# Patient Record
Sex: MALE | Race: WHITE
[De-identification: names, ages, dates, MRNs, and addresses within clinical notes are randomized per-mention and may not be internally consistent; named-entity substitution may affect disease eponyms.]

---

## 2020-09-08 ENCOUNTER — HOSPITAL ENCOUNTER (INPATIENT)
Dept: HOSPITAL 92 - ERS | Age: 55
LOS: 8 days | Discharge: HOME | DRG: 854 | End: 2020-09-16
Attending: FAMILY MEDICINE | Admitting: FAMILY MEDICINE
Payer: COMMERCIAL

## 2020-09-08 VITALS — BODY MASS INDEX: 29.4 KG/M2

## 2020-09-08 DIAGNOSIS — Z20.828: ICD-10-CM

## 2020-09-08 DIAGNOSIS — E87.1: ICD-10-CM

## 2020-09-08 DIAGNOSIS — G62.9: ICD-10-CM

## 2020-09-08 DIAGNOSIS — R73.9: ICD-10-CM

## 2020-09-08 DIAGNOSIS — E83.51: ICD-10-CM

## 2020-09-08 DIAGNOSIS — L03.116: ICD-10-CM

## 2020-09-08 DIAGNOSIS — M86.9: ICD-10-CM

## 2020-09-08 DIAGNOSIS — F10.10: ICD-10-CM

## 2020-09-08 DIAGNOSIS — D64.9: ICD-10-CM

## 2020-09-08 DIAGNOSIS — M79.601: ICD-10-CM

## 2020-09-08 DIAGNOSIS — M14.60: ICD-10-CM

## 2020-09-08 DIAGNOSIS — R65.20: ICD-10-CM

## 2020-09-08 DIAGNOSIS — Z89.422: ICD-10-CM

## 2020-09-08 DIAGNOSIS — M48.061: ICD-10-CM

## 2020-09-08 DIAGNOSIS — A41.9: Primary | ICD-10-CM

## 2020-09-08 DIAGNOSIS — I48.0: ICD-10-CM

## 2020-09-08 LAB
ALBUMIN SERPL BCG-MCNC: 3.5 G/DL (ref 3.5–5)
ALP SERPL-CCNC: 92 U/L (ref 40–110)
ALT SERPL W P-5'-P-CCNC: 16 U/L (ref 8–55)
ANION GAP SERPL CALC-SCNC: 19 MMOL/L (ref 10–20)
AST SERPL-CCNC: 22 U/L (ref 5–34)
BILIRUB SERPL-MCNC: 1.7 MG/DL (ref 0.2–1.2)
BUN SERPL-MCNC: 26 MG/DL (ref 8.4–25.7)
CALCIUM SERPL-MCNC: 8.3 MG/DL (ref 7.8–10.44)
CHLORIDE SERPL-SCNC: 99 MMOL/L (ref 98–107)
CK SERPL-CCNC: 90 U/L (ref 30–200)
CO2 SERPL-SCNC: 19 MMOL/L (ref 22–29)
CREAT CL PREDICTED SERPL C-G-VRATE: 0 ML/MIN (ref 70–130)
CRP SERPL-MCNC: 37.47 MG/DL
GLOBULIN SER CALC-MCNC: 2.8 G/DL (ref 2.4–3.5)
GLUCOSE SERPL-MCNC: 242 MG/DL (ref 70–105)
HGB BLD-MCNC: 15.1 G/DL (ref 14–18)
LIPASE SERPL-CCNC: 15 U/L (ref 8–78)
MAGNESIUM SERPL-MCNC: 2 MG/DL (ref 1.6–2.6)
MCH RBC QN AUTO: 31.6 PG (ref 27–31)
MCV RBC AUTO: 99.3 FL (ref 78–98)
MDIFF COMPLETE?: YES
PLATELET # BLD AUTO: 162 THOU/UL (ref 130–400)
POTASSIUM SERPL-SCNC: 4.2 MMOL/L (ref 3.5–5.1)
RBC # BLD AUTO: 4.77 MILL/UL (ref 4.7–6.1)
SODIUM SERPL-SCNC: 133 MMOL/L (ref 136–145)
TROPONIN I SERPL DL<=0.01 NG/ML-MCNC: 0.02 NG/ML (ref ?–0.03)
TROPONIN I SERPL DL<=0.01 NG/ML-MCNC: 0.02 NG/ML (ref ?–0.03)
URATE SERPL-MCNC: 5.3 MG/DL (ref 3.5–7.2)
WBC # BLD AUTO: 15.1 THOU/UL (ref 4.8–10.8)

## 2020-09-08 PROCEDURE — S0028 INJECTION, FAMOTIDINE, 20 MG: HCPCS

## 2020-09-08 PROCEDURE — 87070 CULTURE OTHR SPECIMN AEROBIC: CPT

## 2020-09-08 PROCEDURE — 83605 ASSAY OF LACTIC ACID: CPT

## 2020-09-08 PROCEDURE — A9579 GAD-BASE MR CONTRAST NOS,1ML: HCPCS

## 2020-09-08 PROCEDURE — 36569 INSJ PICC 5 YR+ W/O IMAGING: CPT

## 2020-09-08 PROCEDURE — 87205 SMEAR GRAM STAIN: CPT

## 2020-09-08 PROCEDURE — 83036 HEMOGLOBIN GLYCOSYLATED A1C: CPT

## 2020-09-08 PROCEDURE — 96366 THER/PROPH/DIAG IV INF ADDON: CPT

## 2020-09-08 PROCEDURE — 82607 VITAMIN B-12: CPT

## 2020-09-08 PROCEDURE — 85007 BL SMEAR W/DIFF WBC COUNT: CPT

## 2020-09-08 PROCEDURE — 96375 TX/PRO/DX INJ NEW DRUG ADDON: CPT

## 2020-09-08 PROCEDURE — 36415 COLL VENOUS BLD VENIPUNCTURE: CPT

## 2020-09-08 PROCEDURE — 83880 ASSAY OF NATRIURETIC PEPTIDE: CPT

## 2020-09-08 PROCEDURE — 83735 ASSAY OF MAGNESIUM: CPT

## 2020-09-08 PROCEDURE — 84550 ASSAY OF BLOOD/URIC ACID: CPT

## 2020-09-08 PROCEDURE — 86140 C-REACTIVE PROTEIN: CPT

## 2020-09-08 PROCEDURE — 85652 RBC SED RATE AUTOMATED: CPT

## 2020-09-08 PROCEDURE — 82746 ASSAY OF FOLIC ACID SERUM: CPT

## 2020-09-08 PROCEDURE — C1751 CATH, INF, PER/CENT/MIDLINE: HCPCS

## 2020-09-08 PROCEDURE — 93306 TTE W/DOPPLER COMPLETE: CPT

## 2020-09-08 PROCEDURE — 96376 TX/PRO/DX INJ SAME DRUG ADON: CPT

## 2020-09-08 PROCEDURE — 85027 COMPLETE CBC AUTOMATED: CPT

## 2020-09-08 PROCEDURE — 96367 TX/PROPH/DG ADDL SEQ IV INF: CPT

## 2020-09-08 PROCEDURE — 96361 HYDRATE IV INFUSION ADD-ON: CPT

## 2020-09-08 PROCEDURE — 96365 THER/PROPH/DIAG IV INF INIT: CPT

## 2020-09-08 PROCEDURE — 82550 ASSAY OF CK (CPK): CPT

## 2020-09-08 PROCEDURE — 85025 COMPLETE CBC W/AUTO DIFF WBC: CPT

## 2020-09-08 PROCEDURE — 83690 ASSAY OF LIPASE: CPT

## 2020-09-08 PROCEDURE — 80202 ASSAY OF VANCOMYCIN: CPT

## 2020-09-08 PROCEDURE — 80053 COMPREHEN METABOLIC PANEL: CPT

## 2020-09-08 PROCEDURE — 84484 ASSAY OF TROPONIN QUANT: CPT

## 2020-09-08 PROCEDURE — 87040 BLOOD CULTURE FOR BACTERIA: CPT

## 2020-09-08 PROCEDURE — U0002 COVID-19 LAB TEST NON-CDC: HCPCS

## 2020-09-08 PROCEDURE — 71045 X-RAY EXAM CHEST 1 VIEW: CPT

## 2020-09-08 PROCEDURE — 93005 ELECTROCARDIOGRAM TRACING: CPT

## 2020-09-08 NOTE — PDOC.FPRHP
- History of Present Illness


Chief Complaint: foot hurts


History of Present Illness: 





Patient is a 54 yo male with history of presenting to [facility] ED due to 

pain in his left foot. Patient states that this has happened before, but not to 

this extent. His left foot began to swell on Saturday, 3 days ago. Patient also 

noticed that his left foot became quite erythematous and painful by Saturday 

afternoon. Patient has a history of a lumbar back surgery. After his back 

surgery, he admits to decreased sensation to his feet. He also reports 

amputation of his left big toe following a bone infection. Today, patient 

states that he woke up and noticed right arm pain and left chest pain. The 

chest pain subsided once he got to the ED.





In the ED, patient was found to be in atrial fibrillation. 











- Allergies/Adverse Reactions


 Allergies











Allergy/AdvReac Type Severity Reaction Status Date / Time


 


No Known Allergies Allergy   Verified 09/09/20 00:48














- History


PMHx: denies past medical history other than surgical history 





PSHx: left first toe amputation 1 year ago, L1-5 surgery 





FHx: noncontributory





SHx: admits to daily EtoH consumption - 1 bottle of Scotch per day, dips 

tobacco daily, occasional marijuana use


 








- Review of Systems


General: reports: fever/chills


Eyes: denies: vision changes


ENT: denies: nasal congestion, rhinorrhea


Respiratory: denies: cough, congestion, shortness of breath


Cardiovascular: reports: chest pain.  denies: palpitation, edema


Gastrointestinal: denies: nausea, vomiting, diarrhea, constipation, abdominal 

pain


Genitourinary: denies: incontinence, dysuria


Skin: denies: rashes, lesions


Musculoskeletal: reports: pain, tenderness, swelling


Neurological: reports: numbness.  denies: weakness


Psychological: denies: anxiety, depression





- Vital signs


BP: 128/63  HR: 139 RR: 26 Tmax: 99.1 Pox: 95% on 2L  Wt: 100 kg   








- Physical Exam


Constitutional: NAD, awake, alert and oriented


HEENT: normocephalic and atraumatic, EOMI, conjunctiva clear


Neck: supple, FROM


Chest: no-tender to palpation


Heart: other (tachycardic, irregular rhythm)


Lungs: no respiratory distress, good air movement, no rales/rhonchi, other (

tachypnec)


Abdomen: non-tender, no masses/distention


Musculoskeletal: other (tenderness to palpation to the right forearm most 

prominently in the wrist joint; amputated right first toe with edema and 

erythema noted)


Neurological: DTRs 2+, other (decreased sensation in biltaeral lower extremities

)


Skin: no rash/lesions, no jaundice


Heme/Lymphatic: no unusual bruising or bleeding, no purpura, no petechia


Psychiatric: normal mood and affect, intact recent and remote memory





FMR H&P: Results





- Labs


Result Diagrams: 


 09/09/20 03:12





 09/09/20 03:12


Lab results: 


 











WBC  15.1 thou/uL (4.8-10.8)  H  09/08/20  13:26    


 


Hgb  15.1 g/dL (14.0-18.0)   09/08/20  13:26    


 


Hct  47.4 % (42.0-52.0)   09/08/20  13:26    


 


MCV  99.3 fL (78.0-98.0)  H  09/08/20  13:26    


 


Plt Count  162 thou/uL (130-400)   09/08/20  13:26    


 


Band Neuts % (Manual)  15 % (5-11)  H  09/08/20  13:26    


 


Sodium  133 mmol/L (136-145)  L  09/08/20  13:26    


 


Potassium  4.2 mmol/L (3.5-5.1)   09/08/20  13:26    


 


Chloride  99 mmol/L ()   09/08/20  13:26    


 


Carbon Dioxide  19 mmol/L (22-29)  L  09/08/20  13:26    


 


BUN  26 mg/dL (8.4-25.7)  H  09/08/20  13:26    


 


Creatinine  1.24 mg/dL (0.7-1.3)   09/08/20  13:26    


 


Glucose  242 mg/dL ()  H  09/08/20  13:26    


 


Lactic Acid  2.8 mmol/L (0.5-2.2)  H  09/08/20  13:26    


 


Calcium  8.3 mg/dL (7.8-10.44)   09/08/20  13:26    


 


Total Bilirubin  1.7 mg/dL (0.2-1.2)  H  09/08/20  13:26    


 


AST  22 U/L (5-34)   09/08/20  13:26    


 


ALT  16 U/L (8-55)   09/08/20  13:26    


 


Alkaline Phosphatase  92 U/L ()   09/08/20  13:26    


 


Creatine Kinase  90 U/L ()   09/08/20  13:26    


 


Serum Total Protein  6.3 g/dL (6.0-8.3)   09/08/20  13:26    


 


Albumin  3.5 g/dL (3.5-5.0)   09/08/20  13:26    


 


Lipase  15 U/L (8-78)   09/08/20  13:26    














- Radiology Interpretation


  ** Other


Additional comment: 


Chest Xray: no radiographic evidence of acute cardiopulmnary process


Foot Xray: ray amputation of great toe, neuropathic osteoarthropathy of midfoot

, no definite acute osseous abnormality, prominent soft tissue swelling








FMR H&P: A/P





- Plan


Afib with RVR


- likely 2/2 to sepsis


- started on dilt drip


- TTE ordered


- admitting to IMCU


- BNP ordered





Sepsis 


- source: likely cellulitis vs. septic joint vs. gout


- SIRS criteria: tachycardic, tachypneic, WBC of 15.1


- lactic acid: 2.8 > 1.4


- started on vanc and cefepime


- s/p fluids in ED


- ESR, CRP, Uric acid pending





Hyperglycemia


- glucose of 242, denies history of DM


- will order A1C





Alcohol Abuse


- reports drinking 1+ pint of scotch/day


- denies any history of withdrawal signs/symptoms


- ASE protocol in place





PCP: [facility]


PPx: Lovenox


Diet: NPO


Code: Full 





Dispo: will admit to IMCU; LOS > 48hrs











FMR H&P: Upper Level





- Pertinent history


55-year-old male presents to the emergency department complaining of right 

wrist left foot and left-sided chest pain. Patient stated that his left foot 

started hurting on Saturday. Right wrist started hurting today. Left-sided 

chest pain started later this morning. Patient does not recall if he had any 

specific injuries. Patient states that she chronically drinks 1 pint or more of 

scotch per day. Denies any history of withdrawal. patient states that he has 

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 

act surgery. Due to this he has had chronic problems with his feet and 

subsequently had a amputation of his left great metatarsal [place] approximately 

one year ago. He noticed that his left foot started to become painful and 

swollen and red a few days ago. He states that he has poor feeling in the foot 

but does experience pain currently.





In the emergency department patient was found to be in a fib with RVR which the 

patient states is a new findings to him. He was also found to have a elevated 

glucose but denies any history of diabetes. Patient admitted that he has not 

seen a doctor in to or greater years. He states that he normally receives most 

of his care at an outside facility but came here today because his Daughter 

works at this hospital and he suspects that he has multiple problems going on 

which concerns him.





ROS:


General: denies fevers, chills, fatigue


pulm: Denies shortness of breath, cough, rhinorrhea


cardiac: denies palpitations, lightheadedness, dizziness. confirms left cgeat 

wall pain


GI: denies nausea, vomiting, diarrhea


Extremities: confirms left fore foot, right wrist pain


Skin: denies any lesions, infections. Confirms left foot swelling and erythema


Neuro: Confirms Bilateral foot numbness. Denies headache, weakness, dizziness





PE:


Gen: Appears sleepy, likely 2/2 to benzos in ED, well nourished, NAD


HEENT: EMOI, no scleral icterus


Pulm: CTAB


Cardiac: irregularly irregular with rapid rate, no audible murmur


Abd: Soft, non tender, non distended


Ext: Left foot with mid 1 metatarsal amputation, swollen, minimally tender and 

erythematous. chronic charcot changes to bilateral feet. right wrist is very 

tender with in ROM or palpation. pulse and sensation intact.


Neuro: A&Ox3


Skin: No rash or jaundice





- Pertinent findings


New onset afib with RVR


- Received 2x 5mg IV push of dilt in ED followed by dilt ggt


   - Initially had low BP response that improved after 2L NS


- Will admit to IMCU and continue dilt drip with goal of rate control


   - If BP continues to be a problem will need to consider amio and cards 

consult


   - If hemodynamically unstable consider defib


- Afib likely 2/2 to new sepsis, if does not resolve will need to consider 

therapeutic anticoagulation


- Trops negative, trend x3 





Sepsis


- Cellulitis vs septic joint vs osteomyelitis vs gout


- ESR, CRP, uric acid, procal ordered


- Likely will need left foot MRI to r/o osteo vs other soft tissue infection - 

xray was not definitive and showed severe soft tissue swelling


- Continue fluid resuscitation


- IV vanc and cefepime


- Continue to trend CBC and monitor left foot





Alcohol Abuse


- 1 pint or more of scotch per day, no hx of withdrawal


- ASE protocol


- Will discuss and encourage cessation





Hyperglycemia


- Likely 2/2 to DM, denies hx


- A1c ordered





Plan: Admit to IMCU for dilt drip management. Goal of rate control. Will 

continue to work up source of sepsis, most likely due to left foot but unsure 

exact cause. Will continue to medically optimize other underlying medical 

issues.





For more detail see PGY1 note above





Hilario Hampton PGY2





- Plan


Date/Time: 09/08/20 9931








I, [], have evaluated this patient and agree with findings/plan as outlined by 

intern resident. Pertinent changes/additions are listed here.








Addendum - Attending





- Attending Attestation


Date/Time: 09/09/20 3557





I personally evaluated the patient and discussed the management with the team.


I agree with the History, Examination, Assessment and Plan documented above 

with any addition or exceptions noted below.





Antibiotics and consider additional imaging. Blood cultures.


Begin low dose cardizem.


O2 PRN.


Monitor closely in IMCU.

## 2020-09-08 NOTE — RAD
XR Foot Lt 3 View STANDARD



INDICATION: Emergency examination for left foot pain



COMPARISON: None.



FINDINGS:



Bones: There is hypertrophic destructive and disorganized changes involving the midfoot likely relate
d to a neuropathic osteoarthropathy. There is postsurgical change of a ray resection involving the

great toe. There is exuberant healed fracture deformity involving the second digit metatarsal shaft. 
No acute fractures evident. There is ankylosis of the PIP joint of the third digit. No radiopaque

foreign body is evident.



Joints: Neuropathic osteoarthropathy changes of the midfoot



Lisfranc alignment: Residual Lisfranc joint alignment appears within normal limits. There is some sag
ging of the Lisfranc joint on the lateral projection suspicious for instability related to

neuropathic osteoarthropathy.



Soft tissues: Severe soft tissue swelling of the midfoot, forefoot and distal foreleg.



IMPRESSION: 

1. Ray amputation of the great toe.

2. Neuropathic osteoarthropathy of the midfoot

3. No definite acute osseous abnormality.

4. Prominent soft tissue swelling left foot and ankle. 



Reported By: Latrell Singh 

Electronically Signed:  9/8/2020 3:01 PM

## 2020-09-08 NOTE — RAD
XR Wrist 3 Rt View STANDARD: 



9/8/2020 4:32 PM



CLINICAL INDICATION: Right wrist pain



COMPARISON: None. 



FINDINGS:



Bones:  No acute osseous abnormality. 



Joints: Joints space is preserved.. 



Soft Tissue: Normal..

  

IMPRESSION: 



No acute osseous abnormality.. 



Reported By: Latrell Snigh 

Electronically Signed:  9/8/2020 5:15 PM

## 2020-09-08 NOTE — RAD
XR Chest 1 View Portable



HISTORY: Chest pain



COMPARISON: None



FINDINGS: The heart size is at upper pole limits of normal. The lungs are well expanded without focal
 areas of consolidation, pneumothorax or pleural effusions.



IMPRESSION: No radiographic evidence of acute cardiopulmonary process.



Reported By: Demian Shelton 

Electronically Signed:  9/8/2020 2:43 PM

## 2020-09-09 LAB
ALBUMIN SERPL BCG-MCNC: 2.9 G/DL (ref 3.5–5)
ALP SERPL-CCNC: 67 U/L (ref 40–110)
ALT SERPL W P-5'-P-CCNC: 13 U/L (ref 8–55)
ANION GAP SERPL CALC-SCNC: 13 MMOL/L (ref 10–20)
AST SERPL-CCNC: 20 U/L (ref 5–34)
BILIRUB SERPL-MCNC: 1.4 MG/DL (ref 0.2–1.2)
BUN SERPL-MCNC: 19 MG/DL (ref 8.4–25.7)
CALCIUM SERPL-MCNC: 8.4 MG/DL (ref 7.8–10.44)
CHLORIDE SERPL-SCNC: 106 MMOL/L (ref 98–107)
CO2 SERPL-SCNC: 20 MMOL/L (ref 22–29)
CREAT CL PREDICTED SERPL C-G-VRATE: 169 ML/MIN (ref 70–130)
GLOBULIN SER CALC-MCNC: 3 G/DL (ref 2.4–3.5)
GLUCOSE SERPL-MCNC: 99 MG/DL (ref 70–105)
HGB BLD-MCNC: 13.7 G/DL (ref 14–18)
MCH RBC QN AUTO: 34.2 PG (ref 27–31)
MCV RBC AUTO: 100 FL (ref 78–98)
MDIFF COMPLETE?: YES
PLATELET # BLD AUTO: 164 THOU/UL (ref 130–400)
POTASSIUM SERPL-SCNC: 3.8 MMOL/L (ref 3.5–5.1)
RBC # BLD AUTO: 4.01 MILL/UL (ref 4.7–6.1)
SODIUM SERPL-SCNC: 135 MMOL/L (ref 136–145)
WBC # BLD AUTO: 15.1 THOU/UL (ref 4.8–10.8)

## 2020-09-09 RX ADMIN — VANCOMYCIN SCH MLS: 1.5 INJECTION, SOLUTION INTRAVENOUS at 03:00

## 2020-09-09 RX ADMIN — DILTIAZEM HYDROCHLORIDE SCH MG: 60 CAPSULE, EXTENDED RELEASE ORAL at 20:51

## 2020-09-09 RX ADMIN — VANCOMYCIN SCH MLS: 1.5 INJECTION, SOLUTION INTRAVENOUS at 14:08

## 2020-09-09 NOTE — MRI
EXAM:  MRI Lower Ext Jt Lt W WO Con



DATE:  9/9/2020 11:41 AM



INDICATION:  Left foot infection



COMPARISON:  Left foot radiograph dated September 8, 2020



FINDING:  Contrast: 20 cc of MultiHance.



There is postprocedural change of a great toe ray amputation. There is extensive soft tissue swelling
 and cellulitis involving the dorsal aspect of the foot and ankle. There is multiloculated fluid

collections surrounding the midfoot and forefoot suspicious for abscesses. One of the largest measure
s 4.3 x 8.2 cm on image 15 of series 9 overlying the dorsal aspect of the midfoot. There is an

additional fluid collection seen along the medial soft tissues of the midfoot, at the level of the ta
lar head measuring 3.3 cm. An additional fluid collection is seen subjacent to the great toe

metatarsal base measuring 2.9 cm on image 27 of series 11. There is some edematous change and mild en
hancement involving the mid foot which has radiographic changes of a neuropathic osteoarthropathy.

Component of an osteomyelitis particularly involving the cuneiforms, navicular and cuboid are of conc
syed. There is prominent healed deformity involving the second digit metatarsal shaft.



IMPRESSION:

1. Abnormal T2 hyperintensity with enhancement involving the mid foot tarsal bones is likely largely 
related to a neuropathic osteoarthropathy; however, superimposed osteomyelitis cannot be entirely

excluded.

2. Prominent loculated peripherally enhancing fluid collections surrounding the midfoot are suspiciou
s for periarticular abscesses.

3. Prominent left foot cellulitis and edema.



Reported By: Latrlel Singh 

Electronically Signed:  9/9/2020 4:10 PM

## 2020-09-09 NOTE — PDOC.FM
- Subjective


Subjective: 


Mr. Perez continues to complain of severe pain in his R hand. He describes the 

pain as a 10/10 burning most intense in his wrist, but extending to the tips of 

his fingers and proximal to the elbow. It is painful constantly and exacerbated 

by movement and palpation. It does not appear swollen and he denies feeling 

tight. Endorses some L foot pain but mostly at his heel which he suspects is d/

t walking a long distance to the ED yesterday. Endorses some L sided heart 

pressure that he states he mostly feels if he has slept wrong on his L 

shoulder. He denies any association with exertion. Denies dyspnea/SOB/trembling.








- Objective


Vital Signs & Weight: 


 Vital Signs (12 hours)











  Temp Pulse Ox


 


 09/09/20 04:00  99.8 F H 


 


 09/08/20 23:44  97.8 F 


 


 09/08/20 20:00  99.2 F  96








 Weight











Weight                         102.149 kg











 Most Recent Monitor Data











Heart Rate from ECG            76


 


NIBP                           135/77


 


NIBP BP-Mean                   96


 


Respiration from ECG           25


 


SpO2                           97














Result Diagrams: 


 09/09/20 03:12





 09/09/20 03:12





Phys Exam





- Physical Examination


Constitutional: NAD


Neck: supple


Respiratory: clear to auscultation bilateral


Cardiovascular: no significant murmur


HR irregular


L midfoot 1+ pitting edema. No edema of R hand/forearm


Dorsalis pedis pulses not palpable b/l. Radial pulses 1+


Psychiatric: normal affect, A&O x 3


Skin: no rash


Deviation from normal: Erythema of L midfoot swelling


-: No erythema/discoloration of R hand/forearm





Dx/Plan





- Plan


Plan: 


This is a 55M who presented to the ED with R arm pain and L sided CP





Afib with RVR


- Likely 2/2 to sepsis therefore not anticoagulating currently


- Received 2x 5mg IV push of dilt in ED followed by dilt gtt


   Initially had low BP response that improved after 2L NS


- Continue dilt gtt


   Admitting to IMCU


   If BP continues to be a problem, will need to consider amio


   If hemodynamically unstable, consider defib


- Fluctuating in and out of NSR


- Will consult cards


- TTE ordered, results pending


- 








Sepsis 


- Source: likely cellulitis vs. septic joint vs. gout


- SIRS criteria: tachycardic, tachypneic, WBC of 15.1


- Lactic acid: 2.8 > 1.4


- Vanc and Cefepime started 9/8


- ESR 68


- CRP37.5


- Uric acid nml


- BCx pending


- Foot xray not definitive. Showed severe soft tissue swelling of midfoot and 

neuropathic osteoarthropathy


- Dr. Chapin saw him and agreed with need for MRI, continued abx


- Will obtain L foot MRI


- Will consult ortho








R arm pain


- Unclear etiology


- Burning 10/10 pain worst at wrist but extending to tips of fingers and 

proximal to elbow


- No erythema/edema/deformity


- Will consult ortho


- Consider MRI








Hyperglycemia


- Glucose of 242 on arrival


- Denies history of DM


- A1C 5.2


- Monitor BGs








Alcohol Use Disorder


- Reports drinking 1+ pint of scotch/day


- Denies any history of withdrawal signs/symptoms


- ASE protocol in place








PCP: [facility]


PPx: Lovenox


Diet: NPO


Code: Full 





Dispo: will admit to IMCU; LOS > 48hrs





Addendum - Attending





- Attending Attestation


Date/Time: 09/09/20 1411





I personally evaluated the patient and discussed the management with the team.


I agree with the History, Examination, Assessment and Plan documented above 

with any addition or exceptions noted below.





Discuss foot and arm with ortho. Continue antibiotics. MRI foot.


D/c gtt after overlapping with PO.


Cards consult for afib

## 2020-09-09 NOTE — CON
DATE OF CONSULTATION:  



CHIEF COMPLAINT:  Left foot infection.



HISTORY OF PRESENT ILLNESS:  Mr. Perez is a 55-year-old male, who presented to the

hospital yesterday.  He reports that over the weekend, he began having malaise and

feeling sickly.  He began having fevers and chills.  He also began having left foot

swelling and redness as well as warmth.  He began having right arm numbness and pain

as well.  The patient has been admitted to the hospital.  He has been started on

intravenous antibiotics for possible sepsis.  MRI has been obtained as well as foot

x-rays.  The patient has a known history of Charcot arthropathy of the feet, left

more severe than the right.  He also has a history of left great toe amputation 1

year ago.  He reports that his foot is always enlarged and malaligned, however, it

is worse now since the weekend.  He has increased swelling, much more so than

normal. 



PAST MEDICAL HISTORY:  Previous back injury and subsequent surgery leading to dense

neuropathy of the bilateral lower extremities. 



PAST SURGICAL HISTORY:  Previous back surgery as well as left great toe amputation.



FAMILY MEDICAL HISTORY:  Noncontributory.



SOCIAL HISTORY:  The patient drinks alcohol daily.  He uses tobacco, uses marijuana.

 He denies other drug use. 



REVIEW OF SYSTEMS:  Positive as per HPI.  Otherwise, negative 10-point review of

systems. 



IMAGING DATA:  X-rays of the left foot as well as MRI of the left foot demonstrate

advanced Charcot arthropathy of the midfoot.  There is previous great toe

amputation.  There are destructive changes and extensive arthropathy throughout the

midfoot.  He does have a large fluid collection dorsally as well as medially at the

foot consistent with possible abscess.  There is some edema and fluid of the

midfoot, which is more consistent with Charcot changes rather than osteomyelitis.

Right wrist x-rays are negative. 



PHYSICAL EXAMINATION:

VITAL SIGNS:  Current temperature is 97.8, heart rate is 89, respiratory rate is 19,

oxygen saturation 99%. 

GENERAL:  He is alert, talkative, no apparent distress. 

HEENT:  Normocephalic, atraumatic. 

RESPIRATORY:  Breathing comfortably. 

ABDOMEN:  Soft, nontender, nondistended. 

MUSCULOSKELETAL:  The patient's left lower extremity has obvious Charcot changes.

There is deformity of the foot with prominent talar head medially.  There is pes

planus.  He has had amputation of the great toe with healed wounds.  There is a

superficial ulceration of the plantar foot.  He has a large dorsal swelling of the

foot with fluctuance to palpation dorsally.  The foot is warm and erythematous.  He

has cellulitic changes as well.  The patient's right upper extremity has difficulty

with motion at the hand.  He has neuropathy to light touch.  He is able to just very

minimally move the fingers.  He has a strongly palpable pulse.  There is no

significant ecchymosis.  No evidence of compartment syndrome. 



IMPRESSION:  A 55-year-old male with peripheral neuropathy, Charcot arthropathy, and

now likely an infection of the midfoot with abscess formation.  The patient also has

neuropathy of the right arm consistent with compression.  I have seen very similar

findings in someone who has passed out from alcohol intoxication and laid on their

arm.  I think this is the most likely scenario explaining his arm findings. 



PLAN:  Regarding the foot, we will plan for operative intervention tomorrow if he is

not significantly improved.  He will likely need a dorsal incision for drainage of

his abscess.  His foot is very severely damaged from his neuropathic changes;

however, I do not think he would want to proceed with amputation at this point.  We

will try to salvage the foot and clear the infection.  He will need to continue

intravenous antibiotics.  Observation for his right arm with elevation and continued

attempts at movement of the fingers.  N.p.o. midnight with preparation for surgery

tomorrow. 







Job ID:  [id number]

## 2020-09-09 NOTE — CON
DATE OF CONSULTATION:  09/09/2020



REASON FOR CONSULTATION:  The patient is in [facility].



HISTORY OF PRESENT ILLNESS:  The patient is pleasant, 55 years old, who presented to

the ER yesterday with left foot pain and right arm pain.  Apparently, swelling in

left foot began 3 days prior to admission.  It has become erythematous and quite

painful.  He does have a history of left great toe amputation in the past.  He also

has a history of Charcot joint in that area.  The right arm pain is somewhat

mysterious.  He does not know how that happened, but he says it is very difficult to

move the right arm and very tender to the touch. 



PAST MEDICAL HISTORY:  Essentially unremarkable.



PAST SURGICAL HISTORY:  Left first toe amputation a year ago.



FAMILY MEDICAL HISTORY:  Unremarkable.



SOCIAL HISTORY:  Drinks up to a pint of scotch per day, but usually not that much.

Dips tobacco daily.  Occasional marijuana use. 



REVIEW OF SYSTEMS:  Otherwise negative.



PHYSICAL EXAMINATION:

VITAL SIGNS:  Temperature 99.4, pulse 76, blood pressure 126/86, O2 saturation 95%. 

GENERAL:  He is awake and alert, in no distress. 

HEENT:  Remarkable for class 4 Mallampati airway with tongue hypertrophy. 

NECK:  No adenopathy or JVD. 

LUNGS:  Clear. 

CARDIAC:  S1 and S2, now regular, currently on Cardizem drip at 5 mg/hour. 

ABDOMEN:  Soft and nontender to palpation. 

EXTREMITIES:  No clubbing.  He has extensive swelling in his left foot with 4+

pitting edema.  He has tenderness over right dorsal aspect of his arm. 



LABORATORY DATA:  Sodium 135, potassium 3.8, chloride 106, CO2 of 20, BUN 19,

creatinine 0.7, and glucose 99.  Hemoglobin A1c is 5.2.  Total bilirubin 1.4.

C-reactive protein 37.  COVID test negative.  White blood cell count 15, hematocrit

40, and platelet count 164 with 67% neutrophils, 22% bands, sedimentation rate is

68. 



IMAGING DATA:  Radiographs of the foot, arm, chest are negative.



ASSESSMENT:  The patient appears to have either some type of cellulitis or

rheumatologic phenomenon going on.  Presentation does seem consistent with sepsis

and bacteremia. 



PLAN:  

1. Agree with antibiotics.

2. Would do further imaging of arm and foot.

3. Consider orthopedic consultation for foot.

4. Monitor for alcohol withdrawal symptoms.

5. Given his alcohol consumption, I would put him on thiamine.







Job ID:  [id number]

## 2020-09-10 LAB
ALBUMIN SERPL BCG-MCNC: 2.8 G/DL (ref 3.5–5)
ALP SERPL-CCNC: 83 U/L (ref 40–110)
ALT SERPL W P-5'-P-CCNC: 26 U/L (ref 8–55)
ANION GAP SERPL CALC-SCNC: 13 MMOL/L (ref 10–20)
AST SERPL-CCNC: 32 U/L (ref 5–34)
BILIRUB SERPL-MCNC: 0.6 MG/DL (ref 0.2–1.2)
BUN SERPL-MCNC: 16 MG/DL (ref 8.4–25.7)
CALCIUM SERPL-MCNC: 8.2 MG/DL (ref 7.8–10.44)
CHLORIDE SERPL-SCNC: 105 MMOL/L (ref 98–107)
CO2 SERPL-SCNC: 22 MMOL/L (ref 22–29)
CREAT CL PREDICTED SERPL C-G-VRATE: 169 ML/MIN (ref 70–130)
GLOBULIN SER CALC-MCNC: 2.8 G/DL (ref 2.4–3.5)
GLUCOSE SERPL-MCNC: 97 MG/DL (ref 70–105)
HGB BLD-MCNC: 12.9 G/DL (ref 14–18)
MCH RBC QN AUTO: 34.2 PG (ref 27–31)
MCV RBC AUTO: 101 FL (ref 78–98)
MDIFF COMPLETE?: YES
PLATELET # BLD AUTO: 180 THOU/UL (ref 130–400)
POTASSIUM SERPL-SCNC: 3.5 MMOL/L (ref 3.5–5.1)
RBC # BLD AUTO: 3.76 MILL/UL (ref 4.7–6.1)
SODIUM SERPL-SCNC: 136 MMOL/L (ref 136–145)
VANCOMYCIN TROUGH SERPL-MCNC: 12.8 UG/ML
WBC # BLD AUTO: 13.8 THOU/UL (ref 4.8–10.8)

## 2020-09-10 PROCEDURE — 0YBN0ZZ EXCISION OF LEFT FOOT, OPEN APPROACH: ICD-10-PCS | Performed by: ORTHOPAEDIC SURGERY

## 2020-09-10 RX ADMIN — VANCOMYCIN SCH MLS: 1.5 INJECTION, SOLUTION INTRAVENOUS at 18:21

## 2020-09-10 RX ADMIN — VANCOMYCIN SCH MLS: 1.5 INJECTION, SOLUTION INTRAVENOUS at 02:09

## 2020-09-10 RX ADMIN — DILTIAZEM HYDROCHLORIDE SCH MG: 60 CAPSULE, EXTENDED RELEASE ORAL at 21:51

## 2020-09-10 RX ADMIN — VANCOMYCIN SCH MLS: 1.5 INJECTION, SOLUTION INTRAVENOUS at 08:45

## 2020-09-10 RX ADMIN — DILTIAZEM HYDROCHLORIDE SCH MG: 60 CAPSULE, EXTENDED RELEASE ORAL at 08:15

## 2020-09-10 NOTE — PRG
DATE OF SERVICE:  09/10/2020



SUBJECTIVE:  Mr. Perez is doing better.  His rate appears controlled.  He has sinus

rhythm with PACs.  He is currently on diltiazem only.  Arm appears to be improved.

He has less pain today versus yesterday. 



OBJECTIVE:  VITAL SIGNS:  Blood pressure 120/80, pulse 72, respirations 20. 

LUNGS:  Clear to auscultation. 

HEART:  Regular rate and rhythm with extrasystolic beat. 

ABDOMEN:  Soft, nontender, nondistended. 

EXTREMITIES:  No edema.



IMPRESSION:  

1. Atrial fibrillation.

2. Cellulitis.

3. Alcohol abuse.

4. Right arm pain.



RECOMMENDATIONS:  

1. Recommend venous duplex of the right upper extremity.

2. Continue Cardizem.

3. The patient is scheduled for I and D today.

4. Given the patient's low CHADS2-VASc score, I would recommend full-dose aspirin.







Job ID:  [id number]

## 2020-09-10 NOTE — CON
DATE OF CONSULTATION:  09/09/2020



REASON FOR CONSULTATION:  Paroxysmal atrial fibrillation.



PRIMARY CARDIOLOGIST:  None.



HISTORY OF PRESENT ILLNESS:  Mr. Perez is a pleasant 55-year-old gentleman who

recently presented with cellulitis.  He was found to be in atrial fibrillation with

rapid ventricular response.  He was placed on IV Cardizem.  He has fluctuated

between atrial fibrillation and sinus rhythm.  He is currently asymptomatic.  His

main complaint today is left arm pain.  He does have a strong alcohol history,

drinking a quart of scotch likely every night.  He continues to work.  He states he

has been told he has had atrial fibrillation in the past, but states was unsure.  He

also was told he had diabetes in the past, but does not feel like he does.  His Hb

A1c was less than 6. 



PAST MEDICAL HISTORY:  Charcot foot.



SURGICAL HISTORY:  Left first toe amputation.



FAMILY HISTORY:  Negative.



SOCIAL HISTORY:  Positive alcohol use.  Positive tobacco use.  Occasional marijuana

use. 



REVIEW OF SYSTEMS:  Ten-point review of systems is reviewed and as above, otherwise

negative. 



PHYSICAL EXAMINATION:

VITAL SIGNS:  Blood pressure 131/81, pulse 72, respirations 20. 

GENERAL:  Patient is a pleasant male who is in no acute distress.  The patient

appears their stated age. 

NEUROLOGIC:  The patient is alert and oriented x3 with no focal neurologic deficits. 

HEENT:  Sclerae without icterus.  Mouth has moist mucous membranes with normal

pallor. 

NECK:  No JVD.  Carotid upstroke brisk.  No bruits bilaterally. 

LUNGS:  Clear to auscultation with unlabored respirations. 

BACK:  No scoliosis or kyphosis. 

CARDIAC:  Regular rate and rhythm with normal S1 and S2.  No S3 or S4 noted.  No

significant rubs, murmurs, thrills, or gallops noted throughout the precordium.  PMI

is not displaced.  There is no parasternal heave. 

ABDOMEN:  Soft, nontender, nondistended.  No peritoneal signs present.  No

hepatosplenomegaly.  No abnormal striae. 

EXTREMITIES:  2+ femoral and 2+ dorsalis pedis pulses.  No cyanosis, clubbing, or

edema. 

SKIN:  No gross abnormalities.



PERTINENT LABORATORY DATA:  Hemoglobin 12.9, hematocrit 38, white blood cell count

13.8.  Creatinine 0.72.  C-reactive protein 37.  BNP of 168. 



IMPRESSION:  

1. Paroxysmal atrial fibrillation.

2. Alcohol abuse.

3. Cellulitis.

4. Right arm pain.



RECOMMENDATIONS:  Patient's CHADS Vasc score is 1.  He does not appear to have

diabetes based on his most recent HbA1c.  He does have hypertension, although states

his blood pressure per his account has been stable.  He is on no medications at

home.  His CHADS score in my opinion is between 0 and 1.  I discussed

anticoagulation therapy versus aspirin.  He would like to proceed with aspirin

therapy only.  Would certainly seem reasonable.  Given that his heart rate is

currently stable, would recommend continuing p.o. Cardizem in addition to antibiotic

therapy.  For his left arm pain, would recommend a venous duplex of the upper

extremity, although no swelling present.  He has an excellent pulse noted to the

radial artery, so unlikely to be an obstruction from a vascular standpoint.  I did

 him on alcohol abuse.  He is on DT protocol. 







Job ID:  [id number]

## 2020-09-10 NOTE — ULT
Exam: Right upper extremity venous ultrasound with Doppler



HISTORY: Right arm pain



COMPARISON: None



TECHNIQUE: Grayscale, color flow, Doppler imaging and spectral wave form analysis of the right approx
imately venous system



FINDINGS:

There is compressibility and flow of the internal jugular vein. The flow in the subclavian vein. Ther
e is compressibility and flow in the axillary vein and brachial vein, cephalic vein, basilic vein,

radial vein and ulnar vein.



IMPRESSION: No evidence of thrombus in the right upper extremity venous system.



Reported By: Rylee Zelaya 

Electronically Signed:  9/10/2020 9:39 AM

## 2020-09-10 NOTE — OP
DATE OF PROCEDURE:  09/10/2020



OPERATION PERFORMED:  Irrigation and debridement of left foot abscess.



PREOPERATIVE DIAGNOSIS:  Left foot infection with abscess over the dorsal foot.



POSTOPERATIVE DIAGNOSIS:  Left foot infection with abscess over the dorsal foot.



COMPLICATIONS:  None.



ESTIMATED BLOOD LOSS:  Minimal.



FIRST ASSISTANT:  Rome Sage PA-C.



IMPLANTS:  None.



INDICATIONS:  Mr. Perez is a 55-year-old male who has Charcot arthropathy of the

foot.  He has developed sepsis.  He has been found to have a large abscess and

infection of the left midfoot.  He has been indicated for irrigation and debridement

of the foot to hopefully eradicate infection.  He is at high risk of further

infection and other complications including the possible need for amputation in the

future. 



DESCRIPTION OF PROCEDURE:  Mr. Perez was identified in the preoperative holding

area.  His correct extremity was marked.  He was carried to the operating room.  He

was positioned supine.  General anesthesia was induced.  A multidisciplinary

time-out was performed.  The left lower extremity was prepped and draped in sterile

fashion. 



We began the procedure with a dorsal incision over the foot.  We dissected down

through the subcutaneous tissues to the fascia, which was opened.  We encountered a

large purulent abscess.  This was cultured.  There was quite a bit of fluid

evacuated.  We at this point thoroughly irrigated with copious lavage.  We performed

an excisional debridement.  We worked down to the bony level.  We opened the midfoot

joints and irrigated into the joints themselves as well.  There was purulence of the

deep joints.  We 

performed an excisional debridement with a knife as well as a curette.  We then

loosely closed the skin edges.  At this point, we packed the wound with a Kerlix

gauze soaked in Betadine.  A sterile dressing was applied.  The patient was taken to

the recovery room in good condition without complication. 







Job ID:  [id number]

## 2020-09-10 NOTE — PDOC.FM
- Subjective


Subjective: 


Mr. Perez is doing well this morning with improved R arm pain. His R arm is 

elevated above his head as recommended by Dr. Ferrara. Pt is aware plan is to 

take him to OR for I&D today. He denies SOB/dyspnea/CP/V. Endorses some D which 

he attributes to lack of diet and several medications. 








- Objective


Vital Signs & Weight: 


 Vital Signs (12 hours)











  Temp


 


 09/10/20 04:00  97.0 F L


 


 09/10/20 00:00  97.4 F L


 


 09/09/20 19:35  97.8 F








 Weight











Admit Weight                   102.149 kg


 


Weight                         102.149 kg











 Most Recent Monitor Data











Heart Rate from ECG            63


 


NIBP                           125/78


 


NIBP BP-Mean                   93


 


Respiration from ECG           14


 


SpO2                           100














I&O: 


 











 09/08/20 09/09/20 09/10/20





 06:59 06:59 06:59


 


Intake Total   2260


 


Output Total   625


 


Balance   1635











Result Diagrams: 


 09/10/20 03:19





 09/10/20 03:19





Phys Exam





- Physical Examination


Constitutional: NAD


Neck: supple


Respiratory: no wheezing, no rales, clear to auscultation bilateral


Cardiovascular: RRR, no significant murmur


Musculoskeletal: pulses present (Strong radial and dp pulses b/l.)


Edema/Erythema of L midfoot increased sicne yesterday.


Neurological: non-focal, moves all 4 limbs


Psychiatric: normal affect, A&O x 3


Skin: no rash





Dx/Plan





- Plan


Plan: 


This is a 55M who presented to the ED with R arm pain and L sided CP





Afib with RVR


- Likely 2/2 to sepsis therefore not anticoagulating currently


- Received 2x 5mg IV push of dilt in ED followed by dilt gtt


   Initially had low BP response that improved after 2L NS


- Dilt gtt discontinued 9/9. Now PO.


   Admitting to IMCU.


   If BP continues to be a problem, will need to consider amio


   If hemodynamically unstable, consider defib


- Fluctuating in and out of NSR therefore keeping him in IMCU, per Christophe


- Consulted cards (Dr. Saeed), appreciate recs


   Recommended continued PO Diltiazem


   Is considering long-term anticoagulation depending on if AFib resolves


- Echo showed EF 50-55% with moderate tricuspid/mitral regurg and mildly 

dilated LA


- 








Sepsis 


- Source: likely cellulitis vs. septic joint vs. gout


- SIRS criteria: tachycardic, tachypneic, WBC of 15.1


- Lactic acid: 2.8 > 1.4


- Vanc and Cefepime started 9/8


- ESR 68


- CRP37.5


- Uric acid nml


- BCx pending


   Two samples NGTD x1 day


- Foot xray not definitive. Showed severe soft tissue swelling of midfoot and 

neuropathic osteoarthropathy


- L foot MRI showed suspicion for periarticular abscess. Could not r/o osteo


- Consulted ortho. Dr. Chapin recommended continuation of abx and I&D of L 

foot on 9/10


- Will consult PT/OT/post-acute care/CM d/t to severity of Charcot foot








R arm pain


- Unclear etiology


- Burning, sharp, deep and superficial pain worst at wrist but extending to 

tips of fingers and proximal to elbow


- No erythema/edema/deformity


- R wrist xray normal


- Ortho consulted, appreciate recs


- Consider MRI


- Dr. Saeed recommended R UE U/S to r/o venous causes despite what would be 

an atypical presentation for it








Hyperglycemia


- Glucose of 242 on arrival


- Denies history of DM


- A1C 5.2


- Monitor BGs








Alcohol Use Disorder


- Reports drinking scotch daily


- Denies any history of withdrawal signs/symptoms


- ASE protocol in place


- No withdrawal sxs








PCP: [facility]


PPx: Lovenox


Diet: NPO


Code: Full 





Dispo: will admit to IMCU; LOS > 48hrs








Addendum - Attending





- Attending Attestation


Date/Time: 09/10/20 9989





I personally evaluated the patient and discussed the management with the team.


I agree with the History, Examination, Assessment and Plan documented above 

with any addition or exceptions noted below.

## 2020-09-10 NOTE — PRG
DATE OF SERVICE:  09/10/2020



SUBJECTIVE:  The patient is going down the OR later today for drainage of abscess in

his left foot.  Overall, he feels better today. 



OBJECTIVE:  VITAL SIGNS:  Temperature 99, pulse 72, blood pressure 131/81. 

HEENT:  Unremarkable. 

NECK:  No JVD. 

LUNGS:  Clear. 

CARDIAC:  S1 and S2.  Regular. 

ABDOMEN:  Soft. 

EXTREMITIES:  Left foot swelling.



LABORATORY DATA:  White blood cell count 13, hematocrit 38, and platelet count 118.

Sodium 136, potassium 3.5, BUN 16, creatinine 0.7, and glucose 97. 



ASSESSMENT:  

1. Charcot joint, left foot.

2. Foot abscess.

3. Alcohol abuse.



PLAN:  

1. Continue antibiotics.

2. Probably stable enough to move out to the surgical floor postop today.







Job ID:  [id number]

## 2020-09-11 LAB
ALBUMIN SERPL BCG-MCNC: (no result) G/DL (ref 3.5–5)
ALP SERPL-CCNC: (no result) U/L (ref 40–110)
ALT SERPL W P-5'-P-CCNC: (no result) U/L (ref 8–55)
ANION GAP SERPL CALC-SCNC: (no result) MMOL/L (ref 10–20)
ANION GAP SERPL CALC-SCNC: 15 MMOL/L (ref 10–20)
AST SERPL-CCNC: (no result) U/L (ref 5–34)
BILIRUB SERPL-MCNC: (no result) MG/DL (ref 0.2–1.2)
BUN SERPL-MCNC: (no result) MG/DL (ref 8.4–25.7)
BUN SERPL-MCNC: 16 MG/DL (ref 8.4–25.7)
CALCIUM SERPL-MCNC: (no result) MG/DL (ref 7.8–10.44)
CALCIUM SERPL-MCNC: 8.3 MG/DL (ref 7.8–10.44)
CHLORIDE SERPL-SCNC: (no result) MMOL/L (ref 98–107)
CHLORIDE SERPL-SCNC: 108 MMOL/L (ref 98–107)
CO2 SERPL-SCNC: (no result) MMOL/L (ref 22–29)
CO2 SERPL-SCNC: 17 MMOL/L (ref 22–29)
CREAT CL PREDICTED SERPL C-G-VRATE: (no result) ML/MIN (ref 70–130)
CREAT CL PREDICTED SERPL C-G-VRATE: 179 ML/MIN (ref 70–130)
GLOBULIN SER CALC-MCNC: (no result) G/DL (ref 2.4–3.5)
GLUCOSE SERPL-MCNC: (no result) MG/DL (ref 70–105)
GLUCOSE SERPL-MCNC: 131 MG/DL (ref 70–105)
HGB BLD-MCNC: 10.6 G/DL (ref 14–18)
MCH RBC QN AUTO: 33.8 PG (ref 27–31)
MCV RBC AUTO: 104 FL (ref 78–98)
MDIFF COMPLETE?: YES
PLATELET # BLD AUTO: 183 THOU/UL (ref 130–400)
POTASSIUM SERPL-SCNC: (no result) MMOL/L (ref 3.5–5.1)
POTASSIUM SERPL-SCNC: 4.3 MMOL/L (ref 3.5–5.1)
RBC # BLD AUTO: 3.13 MILL/UL (ref 4.7–6.1)
SODIUM SERPL-SCNC: (no result) MMOL/L (ref 136–145)
SODIUM SERPL-SCNC: 136 MMOL/L (ref 136–145)
VANCOMYCIN TROUGH SERPL-MCNC: 17 UG/ML
WBC # BLD AUTO: 12.5 THOU/UL (ref 4.8–10.8)

## 2020-09-11 RX ADMIN — VANCOMYCIN SCH MLS: 1.5 INJECTION, SOLUTION INTRAVENOUS at 19:39

## 2020-09-11 RX ADMIN — DILTIAZEM HYDROCHLORIDE SCH MG: 60 CAPSULE, EXTENDED RELEASE ORAL at 09:22

## 2020-09-11 RX ADMIN — VANCOMYCIN SCH MLS: 1.5 INJECTION, SOLUTION INTRAVENOUS at 02:38

## 2020-09-11 RX ADMIN — VANCOMYCIN SCH MLS: 1.5 INJECTION, SOLUTION INTRAVENOUS at 09:24

## 2020-09-11 RX ADMIN — HYDROCODONE BITARTRATE AND ACETAMINOPHEN SCH TAB: 10; 325 TABLET ORAL at 12:03

## 2020-09-11 RX ADMIN — HYDROCODONE BITARTRATE AND ACETAMINOPHEN SCH TAB: 10; 325 TABLET ORAL at 19:10

## 2020-09-11 RX ADMIN — DILTIAZEM HYDROCHLORIDE SCH MG: 60 CAPSULE, EXTENDED RELEASE ORAL at 21:53

## 2020-09-11 NOTE — PRG
DATE OF SERVICE:  09/11/2020



SUBJECTIVE:  Mr. Perez underwent foot surgery yesterday.  He says he feels better

today. 



OBJECTIVE:  VITAL SIGNS:  Temperature 98.2, pulse 63, blood pressure 131/92. 

HEENT:  Unremarkable. 

NECK:  No adenopathy or JVD. 

CHEST:  Fairly clear to auscultation. 

CARDIAC:  S1 and S2.  Regular.  No atrial fibrillation. 

ABDOMEN:  Soft. 

EXTREMITIES:  No edema.



LABORATORY DATA:  White blood cell count 12.5, hematocrit 32, and platelet count

183.  Sodium is 125, potassium 3.3, chloride 96, CO2 of 15, BUN 12, creatinine 0.5,

glucose 91. 



ASSESSMENT:  

1. Sepsis syndrome from left foot abscess/Charcot joint.

2. Hyponatremia on today's labs-seems like an erroneous result unless the patient

drank copious amounts of free water yesterday. 



PLAN:  The patient can be transferred up to the telemetry unit, needs to have

continued monitoring because of the paroxysmal atrial fibrillation.  He is doing

well from a sepsis standpoint.  No further recommendation at this time.  Available

as needed. 







Job ID:  [id number]

## 2020-09-11 NOTE — PDOC.FM
- Subjective


Subjective: 


Mr. Perez is doing well today. The pain in his R wrist has improved and has 

regained some functionality even though he continues to state it is his 

greatest source of pain. He states the pain in his foot is not bothering him. 

He continues to endorse a little L sided CP like when he came in. He describes 

it as a cramping in his L shoulder that he can feel in his chest some. He has 

continued to have bowel movements and denies V/D/SOB/dyspnea. 








- Objective


Vital Signs & Weight: 


 Vital Signs (12 hours)











  Temp BP Pulse Ox


 


 09/11/20 03:36  98.7 F  


 


 09/10/20 23:50  98.2 F  


 


 09/10/20 20:00   151/76 H  98


 


 09/10/20 19:16  98.1 F  








 Weight











Admit Weight                   102.149 kg


 


Weight                         102.149 kg











 Most Recent Monitor Data











Heart Rate from ECG            58


 


NIBP                           122/75


 


NIBP BP-Mean                   90


 


Respiration from ECG           22


 


SpO2                           100














I&O: 


 











 09/09/20 09/10/20 09/11/20





 06:59 06:59 06:59


 


Intake Total  2260 2880


 


Output Total  625 950


 


Balance  1635 1930











Result Diagrams: 


 09/11/20 03:20





 09/11/20 03:20





Phys Exam





- Physical Examination


Constitutional: NAD (Sitting up comfortably, eating breakfast)


Neck: supple, full ROM


Rhonchi in lower lobes b/l


Cardiovascular: no significant murmur


HR irregular


Gastrointestinal: no distention


Musculoskeletal: pulses present (radial pulses 2+ b/l. dp pulse 2+ in R foot. 

Could not check R foot d/t bandage), edema present (In L foot, bandaged. 1+ 

edema at ankle, with mild edema affecting up to mid-tibia)


Neurological: non-focal, moves all 4 limbs


Psychiatric: normal affect, A&O x 3


Skin: no rash





Dx/Plan





- Plan


Plan: 


This is a 55M who presented to the ED with R arm pain and L sided CP





Afib with RVR


- Likely 2/2 to sepsis therefore not anticoagulating currently


- Received 2x 5mg IV push of dilt in ED followed by dilt gtt


   Initially had low BP response that improved after 2L NS


- Dilt gtt discontinued 9/9. Now PO.


   Admitting to IMCU.


   If BP continues to be a problem, will need to consider amio


   If hemodynamically unstable, consider defib


- Consulted cards (Dr. Saeed), appreciate recs


   Recommended continued PO Diltiazem


   Is considering long-term anticoagulation depending on if AFib resolves


   HR continues to be irregular. Feels comfortable sending him to floor on [facility] 

with outpt f/u


- ASA as anticoagulation therapy d/t CHADSVASC score of 1.


- Echo showed EF 50-55% with moderate tricuspid/mitral regurg and mildly 

dilated LA


- 








Sepsis 


- Source: likely cellulitis vs. septic joint vs. gout


- SIRS criteria: tachycardic, tachypneic, WBC of 15.1


- Lactic acid: 2.8 > 1.4


- Vanc and Cefepime started 9/8


- ESR 68


- CRP37.5


- Uric acid nml


- BCx pending


   Two samples NGTD x2 day


- Foot xray not definitive. Showed severe soft tissue swelling of midfoot and 

neuropathic osteoarthropathy


- L foot MRI showed suspicion for periarticular abscess. Could not r/o osteo


- Consulted ortho. Dr. Chapin recommended continuation of abx and I&D of L 

foot on 9/10


   Found purulence in joint space, concerning for osteomyelitis Packed wound


- Will start Norco 10mg q6h with morphine 2mg q4h for break through pain


- Dr. Silva consulted, appreciate recs


- Foot Cx 1: prelim WBCs and mod gram variable cocci


- Foot Cx 2: WBCs, no organisms


- Will consult PT/OT/post-acute care/CM d/t to severity of Charcot foot


- Dr. Chapin comfortable with sending him out of IMCU. Started thiamine.


- CMP showed hyponatremia, hypocalcemia, and anemia today, see below








Hyponatremia


- Na 125 on 9/11, down from 133 yesterday


- Unsure if related to LR in addition to good PO diet


- IVF discontinued


- Will order noon BMP and daily BMPs to monitor








Hypocalcemia


- Ca 6.0 on CMP. 


- Corrected Ca 7.5, borderline normal


- D/c IVF and monitor.








Anemia


- Hb 10.6 today, down from 15


- Suspect this is d/t dilution 2/2 IVF


- Fluids discontinued


- Will continue to monitor








R arm pain


- Unclear etiology


- Burning, sharp, deep and superficial pain worst at wrist but extending to 

tips of fingers and proximal to elbow


- No erythema/edema/deformity


- R wrist xray normal


- Ortho consulted, appreciate recs


- Consider MRI


- Dr. Saeed recommended R UE U/S to r/o venous causes despite what would be 

an atypical presentation for it


   U/S unremarkable


- Pain and function improving. Continue to monitor








Hyperglycemia


- Glucose of 242 on arrival


- Denies history of DM


- A1C 5.2


- Monitor BGs.








Alcohol Use Disorder


- Reports drinking scotch daily


- Denies any history of withdrawal signs/symptoms


- ASE protocol in place


- No withdrawal sxs








PCP: [facility]


PPx: Lovenox


Diet: NPO


Code: Full 





Dispo: will admit to IMCU; LOS > 48hrs








Addendum - Attending





- Attending Attestation


Date/Time: 09/11/20 1223





I personally evaluated the patient and discussed the management with the team.


I agree with the History, Examination, Assessment and Plan documented above 

with any addition or exceptions noted below.





Discuss antibiotic course with Dr. Silva as the abscess apparently extended 

intraarticularly and was of course near bone.

## 2020-09-11 NOTE — CON
DATE OF CONSULTATION:  09/11/2020



REASON FOR CONSULTATION:  Left foot inflammatory process.



HISTORY OF PRESENT ILLNESS:  A 55-year-old with history of chronic neuropathy

associated with prior lumbosacral spine surgery and Charcot arthropathy for many

years now, has had a prior left first toe amputation about a year ago and developed

an ulcer at the bottom aspect of his left mid foot region, which developed into an

abscess.  He was brought in and had I and D by Dr. Ferrara.  The operative note

was reviewed and there was quite a deep penetration of the abscess and required

debridement of joints and scraping of the bone and so forth.  He is receiving

broad-spectrum coverage right now.  He had some chest pain and some arrhythmia,

which led to admission to [facility], but is being transferred to one of the rooms at the

moment.  Denies any headaches, visual symptoms, sore throat, odynophagia, or

dysphagia.  No cough or sputum production.  No chest pain.  No abdominal pain or

diarrhea.  No genitourinary symptoms. 



PAST MEDICAL HISTORY:  Includes neuropathy following lumbosacral spine surgery,

prior left first toe amputation, and Charcot arthropathy right and left feet. 



FAMILY HISTORY:  Noncontributory.



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  He drinks what he

states like of more than 5 drinks a day or one quart of scotch per day.  Does not

smoke.  Dips tobacco. 



CURRENT MEDICATION LIST:  Includes;

1. Cefepime.

2. Vancomycin.

3. Diltiazem.

4. Enoxaparin.

5. Hydrocodone.

6. Thiamine.



ALLERGIES:  NO KNOWN DRUG ALLERGIES.



PHYSICAL EXAMINATION:

VITAL SIGNS:  His temperature max was 99.8 on arrival, he has been afebrile since.

/80, heart rate 65, and respiratory rate 21 to 29. 

GENERAL:  Appears in no distress.  His left foot is dressed. 

SKIN:  Peripheral IV access.  He is voiding in the urinal.  No lymphadenopathy. 

HEENT:  Ocular movements conjugate.  Oral cavity normal. 

NECK:  Supple. 

LUNGS:  Symmetric clear breath sounds. 

HEART:  S1 and S2.  Regular rate.  No S3 or S4. 

ABDOMEN:  Soft, not distended or tender.  No ascites.  No bladder distention. 

EXTREMITIES:  Pulses are excellent in lower extremities including popliteal and

dorsalis pedis.  Moves extremities equally.  There is evidence of Charcot

deformities in right and left feet.  I do not have a photo of the wounds, so I did

not remove the dressing. 

NEUROLOGIC:  His cognitive function appears to be intact.  His speech is normal.

Recollection is normal orientation. 



LABORATORY DATA:  White cell count is 15.1, it is now 12.5, hemoglobin 10, and

platelets 183 with 78% neutrophils.  Sodium 136, creatinine 0.68, and bilirubin 1.4

and 0.6.  Transaminases normal.  Alkaline phosphatase 67 and albumin 2.9.  .

CRP 37.  COVID negative.  Vancomycin trough was 17.  He had a duplex ultrasound of

the right upper extremity because of right arm pain and there was no evidence of

DVT.  There is an echocardiogram with EF 50% to 55%, mildly dilated left atrium,

moderate mitral regurg, mildly thickened trileaflet aortic valve, moderate tricuspid

regurgitation, and there is a chest x-ray with no abnormal findings.  There was a

MRI of the foot, which showed abnormal T2 hyperintensity with enhancement in mid

foot tarsal bones and loculated peripheral enhancing fluid collections surrounding

the midfoot region and cellulitis. 



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

spondyloarthropathy and surgery, bilateral Charcot arthropathy, and inflammatory

process left mid foot region secondary to the above with involvement of the deep

space, bone structure and joint structure as well, status post surgical debridement. 



DISCUSSION:  We will have to treat this aggressively in view of the depth of

involvement the associated Charcot.  He does have good vascular supply.  We will

wait for the final identification of the organisms and then define if the treatment

is going to be oral or IV. 







Job ID:  [id number]

## 2020-09-11 NOTE — PRG
DATE OF SERVICE:  09/11/2020



SUBJECTIVE:  Mr. Perez is doing well.  He has remained in sinus rhythm over the last

48 hours.  He has intermittent PACs present.  No current symptoms.  He underwent I

and D yesterday.  He is on antibiotic therapy. 



OBJECTIVE:  VITAL SIGNS:  Blood pressure 131/92, pulse 63, and respirations 20. 

LUNGS:  Clear to auscultation. 

HEART:  Regular rate and rhythm. 

ABDOMEN:  Soft, nontender, and nondistended. 

EXTREMITIES:  No edema.



PERTINENT LABORATORY DATA:  Hemoglobin 10.6, white blood cell count 12.5.



IMPRESSION:  

1. Atrial flutter/fibrillation.

2. Cellulitis.

3. Alcohol abuse.



RECOMMENDATIONS:  

1. I counseled him on cessation of all alcohol.

2. Continue full-dose aspirin, PPI, and calcium channel blockade.

3. Plan on outpatient atrial fibrillation ablation and EP referral.

4. Okay from my standpoint to DC to telemetry monitoring for further evaluation.







Job ID:  [id number]

## 2020-09-12 LAB
ALBUMIN SERPL BCG-MCNC: 2.8 G/DL (ref 3.5–5)
ALP SERPL-CCNC: 83 U/L (ref 40–110)
ALT SERPL W P-5'-P-CCNC: 26 U/L (ref 8–55)
ANION GAP SERPL CALC-SCNC: 14 MMOL/L (ref 10–20)
AST SERPL-CCNC: 22 U/L (ref 5–34)
BILIRUB SERPL-MCNC: 0.5 MG/DL (ref 0.2–1.2)
BUN SERPL-MCNC: 17 MG/DL (ref 8.4–25.7)
CALCIUM SERPL-MCNC: 8.2 MG/DL (ref 7.8–10.44)
CHLORIDE SERPL-SCNC: 106 MMOL/L (ref 98–107)
CO2 SERPL-SCNC: 23 MMOL/L (ref 22–29)
CREAT CL PREDICTED SERPL C-G-VRATE: 169 ML/MIN (ref 70–130)
GLOBULIN SER CALC-MCNC: 3.3 G/DL (ref 2.4–3.5)
GLUCOSE SERPL-MCNC: 83 MG/DL (ref 70–105)
HGB BLD-MCNC: 12.6 G/DL (ref 14–18)
MACROCYTES BLD QL SMEAR: (no result) (100X)
MCH RBC QN AUTO: 32.2 PG (ref 27–31)
MCV RBC AUTO: 101 FL (ref 78–98)
MDIFF COMPLETE?: YES
PLATELET # BLD AUTO: 297 THOU/UL (ref 130–400)
POLYCHROMASIA BLD QL SMEAR: (no result) (100X)
POTASSIUM SERPL-SCNC: 3.9 MMOL/L (ref 3.5–5.1)
RBC # BLD AUTO: 3.91 MILL/UL (ref 4.7–6.1)
SODIUM SERPL-SCNC: 139 MMOL/L (ref 136–145)
VANCOMYCIN TROUGH SERPL-MCNC: 24.3 UG/ML
WBC # BLD AUTO: 12.3 THOU/UL (ref 4.8–10.8)

## 2020-09-12 RX ADMIN — DILTIAZEM HYDROCHLORIDE SCH MG: 60 CAPSULE, EXTENDED RELEASE ORAL at 09:34

## 2020-09-12 RX ADMIN — HYDROCODONE BITARTRATE AND ACETAMINOPHEN SCH TAB: 10; 325 TABLET ORAL at 06:31

## 2020-09-12 RX ADMIN — HYDROCODONE BITARTRATE AND ACETAMINOPHEN SCH TAB: 10; 325 TABLET ORAL at 11:39

## 2020-09-12 RX ADMIN — DILTIAZEM HYDROCHLORIDE SCH MG: 60 CAPSULE, EXTENDED RELEASE ORAL at 20:55

## 2020-09-12 RX ADMIN — VANCOMYCIN HYDROCHLORIDE SCH MLS: 1 INJECTION, POWDER, LYOPHILIZED, FOR SOLUTION INTRAVENOUS at 12:55

## 2020-09-12 RX ADMIN — HYDROCODONE BITARTRATE AND ACETAMINOPHEN SCH TAB: 10; 325 TABLET ORAL at 23:55

## 2020-09-12 RX ADMIN — HYDROCODONE BITARTRATE AND ACETAMINOPHEN SCH TAB: 10; 325 TABLET ORAL at 18:00

## 2020-09-12 RX ADMIN — VANCOMYCIN HYDROCHLORIDE SCH MLS: 1 INJECTION, POWDER, LYOPHILIZED, FOR SOLUTION INTRAVENOUS at 20:51

## 2020-09-12 RX ADMIN — HYDROCODONE BITARTRATE AND ACETAMINOPHEN SCH TAB: 10; 325 TABLET ORAL at 00:23

## 2020-09-12 RX ADMIN — VANCOMYCIN HYDROCHLORIDE SCH MLS: 1 INJECTION, POWDER, LYOPHILIZED, FOR SOLUTION INTRAVENOUS at 04:25

## 2020-09-12 NOTE — EKG
Test Reason : 

Blood Pressure : ***/*** mmHG

Vent. Rate : 177 BPM     Atrial Rate : 178 BPM

   P-R Int : 000 ms          QRS Dur : 128 ms

    QT Int : 252 ms       P-R-T Axes : 000 059 -45 degrees

   QTc Int : 432 ms

 

Atrial fibrillation with rapid ventricular response

Right bundle branch block

Abnormal ECG

 

Confirmed by GRETCHEN SUTTON, ARY ([id number]),  MALIK COOK ([id number]) on 9/12/2020 1:56:37 PM

 

Referred By:             Confirmed By:ARY GODINEZ M.D.

## 2020-09-12 NOTE — PDOC.FM
- Subjective


Subjective: 





Patient complains of right arm pain that has mildly improved since yesterday. 

He notes tenderness to touch but denies erythema and warmth. No left foot pain. 

He denies headache, vision changes, chest pain, SOB, nausea, abdominal pain and 

edema. 





- Objective


MAR Reviewed: Yes


Vital Signs & Weight: 


 Vital Signs (12 hours)











  Temp Pulse Resp BP BP BP Pulse Ox


 


 09/12/20 04:00     123/80    93 L


 


 09/12/20 03:41  98.1 F  63  20    123/80  93 L


 


 09/12/20 00:00     121/79   


 


 09/11/20 23:45  98.4 F  69  20    121/79  95


 


 09/11/20 20:00     128/85   


 


 09/11/20 19:55  98.3 F  67  14   128/85   95








 Weight











Admit Weight                   102.149 kg


 


Weight                         102.149 kg











 Most Recent Monitor Data











Heart Rate from ECG            59


 


NIBP                           133/82


 


NIBP BP-Mean                   99


 


Respiration from ECG           25


 


SpO2                           93














I&O: 


 











 09/10/20 09/11/20 09/12/20





 06:59 06:59 06:59


 


Intake Total 2260 4760 240


 


Output Total 625 2425 200


 


Balance 1635 2335 40











Result Diagrams: 


 09/12/20 04:09





 09/12/20 04:09





Phys Exam





- Physical Examination


Constitutional: NAD


HEENT: moist MMs, sclera anicteric


Neck: supple, full ROM


Respiratory: no wheezing, clear to auscultation bilateral


Cardiovascular: no significant murmur, irregular


Irregularly irregular 


Gastrointestinal: soft, non-tender


Musculoskeletal: pulses present, edema present (1+ pitting edema)


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 Left foot wrapped in gauze. Able to move toes 


No erythema or edema noted beyond gauze. Right arm - exquisite tenderness


Neurological: non-focal, moves all 4 limbs


Psychiatric: normal affect, A&O x 3


Skin: no rash, cap refill <2 seconds





Dx/Plan





- Plan


Plan: 





This is a 55M who presented to the ED with R arm pain and L sided CP





Sepsis 2/2 left foot abscess s/p I&D


- Hx charot arthropathy. L foot xray showed severe soft tissue swelling of 

midfoot and neuropathic osteoarthropathy. 


- L foot MRI showed suspicion for periarticular abscess. Could not r/o osteo. 


- Met SIRS criteria in ED: tachycardic, tachypneic, WBC of 15.1


- Lactic acid: 2.8 -> 1.4


- Vanc and Cefepime started 9/8


- Underwent I&D on 9/10 by Dr. Chapin who found purulence in joint space, 

concerning for osteomyelitis


- Dr. Silva consulted, appreciate recs


   Will evaluate antibiotic regimen after blood and wound culture final result 


- PT/OT/post-acute care/CM consulted d/t to severity of Charcot foot


- Continue Norco 10mg q6h with morphine 2mg q4h for break through pain





Paroxysmal afib


- Hx of paroxysmal afib, RVR likely 2/2 to sepsis


- Received 2x 5mg IV push of dilt in ED followed by dilt gtt


- Dilt gtt discontinued 9/9. Now PO


- Echo showed EF 50-55% with moderate tricuspid/mitral regurg and mildly 

dilated LA


- Consulted cards (Dr. Saeed), appreciate recs


   Recommended continued PO diltiazem, ASA, PPI


   Will evaluate patient outpatient for Afib ablation with possible EP referral 








R arm pain


- Unclear etiology. Xray normal. US negative for DVT. 


- Ortho consulted, appreciate recs. Believe pain could possibly due to nerve 

palsy 


- Exquisite tenderness could indicate gout presentation. Will administered 

colchicine for 2 doses








Alcohol Use Disorder


- Reports drinking scotch daily


- Denies any history of withdrawal signs/symptoms


- ASE protocol in place








PCP: [facility]


PPx: Lovenox


Diet: NPO


Code: Full 





Dispo: home pending culture results with adequate antibiotic coverage








Addendum - Attending





- Attending Attestation


Date/Time: 09/12/20 7129





I personally evaluated the patient and discussed the management with Dr. Rivera.


I agree with the History, Examination, Assessment and Plan documented above 

with any addition or exceptions noted below.


Patient remains on antibiotics.  Dr. Silva is awaiting final cultures to 

determine long-term antibiotic course.  Pt denies pain from the foot.  He is 

complaining of pain in the right hand/wrist/forearm that is tender to light 

tough.  Pulses palpable.  Previous XR did not suggest infection.  Consideration 

for possible gout, will give trial of colchicine.  Continue pain control.

## 2020-09-13 LAB
ALBUMIN SERPL BCG-MCNC: 2.9 G/DL (ref 3.5–5)
ALP SERPL-CCNC: 76 U/L (ref 40–110)
ALT SERPL W P-5'-P-CCNC: 24 U/L (ref 8–55)
ANION GAP SERPL CALC-SCNC: 12 MMOL/L (ref 10–20)
AST SERPL-CCNC: 18 U/L (ref 5–34)
BILIRUB SERPL-MCNC: 1.1 MG/DL (ref 0.2–1.2)
BUN SERPL-MCNC: 12 MG/DL (ref 8.4–25.7)
CALCIUM SERPL-MCNC: 8.6 MG/DL (ref 7.8–10.44)
CHLORIDE SERPL-SCNC: 102 MMOL/L (ref 98–107)
CO2 SERPL-SCNC: 25 MMOL/L (ref 22–29)
CREAT CL PREDICTED SERPL C-G-VRATE: 183 ML/MIN (ref 70–130)
GLOBULIN SER CALC-MCNC: 3.3 G/DL (ref 2.4–3.5)
GLUCOSE SERPL-MCNC: 101 MG/DL (ref 70–105)
HGB BLD-MCNC: 13.2 G/DL (ref 14–18)
MCH RBC QN AUTO: 34.3 PG (ref 27–31)
MCV RBC AUTO: 100 FL (ref 78–98)
MDIFF COMPLETE?: YES
PLATELET # BLD AUTO: 339 THOU/UL (ref 130–400)
POTASSIUM SERPL-SCNC: 4.1 MMOL/L (ref 3.5–5.1)
RBC # BLD AUTO: 3.86 MILL/UL (ref 4.7–6.1)
SODIUM SERPL-SCNC: 135 MMOL/L (ref 136–145)
VANCOMYCIN TROUGH SERPL-MCNC: 15.7 UG/ML
WBC # BLD AUTO: 12.3 THOU/UL (ref 4.8–10.8)

## 2020-09-13 RX ADMIN — HYDROCODONE BITARTRATE AND ACETAMINOPHEN SCH TAB: 10; 325 TABLET ORAL at 12:21

## 2020-09-13 RX ADMIN — Medication SCH TAB: at 09:41

## 2020-09-13 RX ADMIN — VANCOMYCIN HYDROCHLORIDE SCH MLS: 1 INJECTION, POWDER, LYOPHILIZED, FOR SOLUTION INTRAVENOUS at 20:49

## 2020-09-13 RX ADMIN — HYDROCODONE BITARTRATE AND ACETAMINOPHEN SCH TAB: 10; 325 TABLET ORAL at 17:04

## 2020-09-13 RX ADMIN — VANCOMYCIN HYDROCHLORIDE SCH MLS: 1 INJECTION, POWDER, LYOPHILIZED, FOR SOLUTION INTRAVENOUS at 12:21

## 2020-09-13 RX ADMIN — VANCOMYCIN HYDROCHLORIDE SCH MLS: 1 INJECTION, POWDER, LYOPHILIZED, FOR SOLUTION INTRAVENOUS at 04:33

## 2020-09-13 RX ADMIN — HYDROCODONE BITARTRATE AND ACETAMINOPHEN SCH TAB: 10; 325 TABLET ORAL at 05:42

## 2020-09-13 RX ADMIN — DILTIAZEM HYDROCHLORIDE SCH MG: 60 CAPSULE, EXTENDED RELEASE ORAL at 20:41

## 2020-09-13 RX ADMIN — DILTIAZEM HYDROCHLORIDE SCH MG: 60 CAPSULE, EXTENDED RELEASE ORAL at 09:41

## 2020-09-13 RX ADMIN — MEROPENEM AND SODIUM CHLORIDE SCH MLS: 1 INJECTION, SOLUTION INTRAVENOUS at 18:44

## 2020-09-13 NOTE — PDOC.FM
- Subjective


Subjective: 





The patient says his right arm pain is much improved since receiving colchicine 

yesterday. He says he is now able to touch his arm and move it without 

exacerbation of pain. He denies right foot pain. He also denies headache, 

vision changes, chest pain, SOB, nausea, abdominal pain and lower extremity 

edema.





- Objective


MAR Reviewed: Yes


Vital Signs & Weight: 


 Vital Signs (12 hours)











  Temp Pulse Resp BP Pulse Ox


 


 09/13/20 00:28      93 L


 


 09/12/20 20:25  99.4 F  81  14  154/86 H  93 L








 Weight











Admit Weight                   102.149 kg


 


Weight                         105.687 kg











 Most Recent Monitor Data











Heart Rate from ECG            59


 


NIBP                           133/82


 


NIBP BP-Mean                   99


 


Respiration from ECG           25


 


SpO2                           93














I&O: 


 











 09/11/20 09/12/20 09/13/20





 06:59 06:59 06:59


 


Intake Total 4760 540 1400


 


Output Total 2425 860 1050


 


Balance 2335 -320 350











Result Diagrams: 


 09/13/20 04:03





 09/13/20 04:03





Phys Exam





- Physical Examination


Constitutional: NAD


HEENT: moist MMs, sclera anicteric


Neck: supple, full ROM


Respiratory: no wheezing, clear to auscultation bilateral


Cardiovascular: RRR, no significant murmur


Gastrointestinal: soft, non-tender, positive bowel sounds


Musculoskeletal: pulses present, edema present (2+ pitting edema up to mid left 

shin)


No right arm tenderness. No left leg tenderness. Able to move left toes. 


Full ROM to right arm.


Neurological: non-focal, moves all 4 limbs


Lymphatic: no nodes


Psychiatric: normal affect, A&O x 3


Skin: no rash, cap refill <2 seconds


Deviation from normal: No erythema or redness to right arm or left leg





Dx/Plan





- Plan


Plan: 





This is a 55M who presented to the ED with R arm pain and L sided CP





Sepsis, resolved 2/2 left foot abscess s/p I&D


Left charot arthropathy


- L foot xray showed severe soft tissue swelling of midfoot and neuropathic 

osteoarthropathy. 


- L foot MRI showed suspicion for periarticular abscess. Could not r/o osteo. 


- Met SIRS criteria in ED with tachycardic, tachypneic, WBC of 15.1. Lactic acid

: 2.8 -> 1.4


- Vanc and Cefepime started 9/8


- Underwent I&D on 9/10 by Dr. Chapin who found purulence in joint space, 

concerning for osteomyelitis


- Dr. Silva consulted. Will assess long-term antibiotic regimen after blood and 

wound culture final result at 72 hours


- PT/OT/post-acute care/CM consulted d/t to severity of Charcot foot


- Continue Norco 10mg q6h with morphine 2mg q4h for break through pain





Paroxysmal afib


- Hx of paroxysmal afib, RVR likely 2/2 to sepsis


- Received 2x 5mg IV push of dilt in ED followed by dilt gtt. Dilt gtt 

discontinued 9/9. Now PO


- Echo showed EF 50-55% with moderate tricuspid/mitral regurg and mildly 

dilated LA


- Consulted cards (Dr. Saeed), appreciate recs


   Recommended continued PO diltiazem, ASA, PPI


   Will evaluate patient outpatient for Afib ablation with possible EP referral 





Elevated BP


BP ranged 150-160 systolic. 


-Start lisinopril 10mg daily 


-Continue to monitor 








R arm pain likely 2/2 gout


- Ortho consulted


- Xray normal. US negative for DVT. 


- Concern for atypical gout presentation given exquisite tenderness. 

Administered colchicine on 9/12 with significant symptom improvement. 


- Continue colchicine 0.6mg today








Alcohol Use Disorder


- Reports drinking scotch daily


- Denies any history of withdrawal signs/symptoms


- ASE protocol in place


- B12 elevated at 1254. Folate low 5.7. Started folic acid/B12 supplement daily.








PCP: [facility]


PPx: Lovenox


Code: Full 





Dispo: home pending culture results with adequate antibiotic coverage





Addendum - Attending





- Attending Attestation


Date/Time: 09/13/20 1310





I personally evaluated the patient and discussed the management with Dr. Rivera.


I agree with the History, Examination, Assessment and Plan documented above 

with any addition or exceptions noted below.


The patient has noted significant improvement in the right arm pain with the 

colchicine.  Will give 0.6 mg dose today.  Continue antibiotics.  Awaiting 

final cultures for long term antibiotics.  Pt remains hypertensive, will add 

lisinopril.

## 2020-09-13 NOTE — PRG
DATE OF SERVICE:  09/13/2020



SUBJECTIVE:  Mr. Perez has no pain in the right foot.  His gout related pain in the

upper extremities improved.  He denies cough, chest pain, or dyspnea.  No abdominal

pain.  No diarrhea. 



OBJECTIVE:  VITAL SIGNS:  Show T-max 99.3, /80, heart rate 68, respiratory

rate 20, O2 saturation 96%. 

HEART:  Normal. 

LUNGS:  Normal. 

ABDOMEN:  Soft and nontender. 

EXTREMITIES:  Some edema in lower extremities.  The wound with 2 approximating

stitches noted some serous drainage.  Erythema is less. 



LABORATORY DATA:  Sodium 135, creatinine 0.69.  White cell count is still high at

12.3, hemoglobin 13, platelets 339 with 12% lymphocytes.  Microbiology, yet no

growth reported.  Blood cultures or the sample from the foot swab, no organisms seen

thus far. 



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

spondyloarthropathy, and surgery; bilateral Charcot arthropathy, inflammatory

process in left mid foot region with involvement of the deep space bone structure

and joint structures as well, status post surgical debridement, no growth yet to

direct our post-hospital phase treatment.  We will switch him to meropenem to get

some anaerobic coverage as well and may be able to discontinue vancomycin if the

final cultures do not yield Staphylococcus aureus.  We may have to make discharge

planning without the benefit of microbiology information. 







Job ID:  [id number]

## 2020-09-14 LAB
ALBUMIN SERPL BCG-MCNC: 3 G/DL (ref 3.5–5)
ALP SERPL-CCNC: 86 U/L (ref 40–110)
ALT SERPL W P-5'-P-CCNC: 30 U/L (ref 8–55)
ANION GAP SERPL CALC-SCNC: 12 MMOL/L (ref 10–20)
AST SERPL-CCNC: 27 U/L (ref 5–34)
BILIRUB SERPL-MCNC: 0.9 MG/DL (ref 0.2–1.2)
BUN SERPL-MCNC: 13 MG/DL (ref 8.4–25.7)
CALCIUM SERPL-MCNC: 8.7 MG/DL (ref 7.8–10.44)
CHLORIDE SERPL-SCNC: 101 MMOL/L (ref 98–107)
CO2 SERPL-SCNC: 25 MMOL/L (ref 22–29)
CREAT CL PREDICTED SERPL C-G-VRATE: 173 ML/MIN (ref 70–130)
CREAT CL PREDICTED SERPL C-G-VRATE: 175 ML/MIN (ref 70–130)
GLOBULIN SER CALC-MCNC: 3.5 G/DL (ref 2.4–3.5)
GLUCOSE SERPL-MCNC: 126 MG/DL (ref 70–105)
HGB BLD-MCNC: 13.3 G/DL (ref 14–18)
HGB BLD-MCNC: 13.7 G/DL (ref 14–18)
MCH RBC QN AUTO: 32.9 PG (ref 27–31)
MCV RBC AUTO: 101 FL (ref 78–98)
MDIFF COMPLETE?: YES
PLATELET # BLD AUTO: 396 THOU/UL (ref 130–400)
PLATELET # BLD AUTO: 399 THOU/UL (ref 130–400)
POTASSIUM SERPL-SCNC: 4.2 MMOL/L (ref 3.5–5.1)
RBC # BLD AUTO: 4.16 MILL/UL (ref 4.7–6.1)
SODIUM SERPL-SCNC: 134 MMOL/L (ref 136–145)
VANCOMYCIN TROUGH SERPL-MCNC: 15.7 UG/ML
WBC # BLD AUTO: 12.9 THOU/UL (ref 4.8–10.8)

## 2020-09-14 PROCEDURE — 02HV33Z INSERTION OF INFUSION DEVICE INTO SUPERIOR VENA CAVA, PERCUTANEOUS APPROACH: ICD-10-PCS | Performed by: RADIOLOGY

## 2020-09-14 RX ADMIN — HYDROCODONE BITARTRATE AND ACETAMINOPHEN SCH TAB: 10; 325 TABLET ORAL at 11:58

## 2020-09-14 RX ADMIN — Medication SCH TAB: at 08:58

## 2020-09-14 RX ADMIN — MEROPENEM AND SODIUM CHLORIDE SCH MLS: 1 INJECTION, SOLUTION INTRAVENOUS at 01:53

## 2020-09-14 RX ADMIN — VANCOMYCIN HYDROCHLORIDE SCH MLS: 1 INJECTION, POWDER, LYOPHILIZED, FOR SOLUTION INTRAVENOUS at 20:59

## 2020-09-14 RX ADMIN — HYDROCODONE BITARTRATE AND ACETAMINOPHEN SCH TAB: 10; 325 TABLET ORAL at 17:29

## 2020-09-14 RX ADMIN — DILTIAZEM HYDROCHLORIDE SCH MG: 60 CAPSULE, EXTENDED RELEASE ORAL at 08:58

## 2020-09-14 RX ADMIN — MEROPENEM AND SODIUM CHLORIDE SCH MLS: 1 INJECTION, SOLUTION INTRAVENOUS at 09:08

## 2020-09-14 RX ADMIN — VANCOMYCIN HYDROCHLORIDE SCH MLS: 1 INJECTION, POWDER, LYOPHILIZED, FOR SOLUTION INTRAVENOUS at 11:59

## 2020-09-14 RX ADMIN — MEROPENEM AND SODIUM CHLORIDE SCH MLS: 1 INJECTION, SOLUTION INTRAVENOUS at 17:29

## 2020-09-14 RX ADMIN — DILTIAZEM HYDROCHLORIDE SCH MG: 60 CAPSULE, EXTENDED RELEASE ORAL at 21:00

## 2020-09-14 RX ADMIN — HYDROCODONE BITARTRATE AND ACETAMINOPHEN SCH TAB: 10; 325 TABLET ORAL at 00:22

## 2020-09-14 RX ADMIN — HYDROCODONE BITARTRATE AND ACETAMINOPHEN SCH TAB: 10; 325 TABLET ORAL at 06:07

## 2020-09-14 RX ADMIN — VANCOMYCIN HYDROCHLORIDE SCH MLS: 1 INJECTION, POWDER, LYOPHILIZED, FOR SOLUTION INTRAVENOUS at 03:30

## 2020-09-14 NOTE — PRG
DATE OF SERVICE:  09/14/2020



SUBJECTIVE:  Mr. Perez' rhythm has been stable except for yesterday when he had a

short burst of atrial fibrillation.  This was self-limiting.  No other complaints or

issues noted. 



OBJECTIVE:  VITAL SIGNS:  Blood pressure 135/85, pulse 63, temperature 98.5. 

LUNGS:  Clear to auscultation. 

HEART:  Regular rate and rhythm. 

ABDOMEN:  Soft, nontender, nondistended. 

EXTREMITIES:  No significant change.



IMPRESSION:  

1. Paroxysmal atrial fibrillation.

2. Alcohol abuse.

3. Cellulitis.

4. Charcot foot.



RECOMMENDATIONS:  Given that Mr. Perez continues to have intermittent episodes of

atrial flutter/atrial fibrillation, we would recommend short-term anticoagulation

therapy.  We would recommend 1 mg/kg subcu q.12 of Lovenox while in the hospital in

case other procedure is needed.  We then transition to Eliquis 5 mg one p.o. b.i.d.

Recommend EP consultation for outpatient followup for a possible atrial flutter

ablation.  At that point, he would only require anticoagulation therapy for one

month.  Otherwise, he appears stable and doing well. 







Job ID:  [id number]

## 2020-09-14 NOTE — PRG
DATE OF SERVICE:  09/14/2020



Mr. Perez has several medical problems, which are currently stable.  He is on some

diltiazem for his atrial fibrillation.  His foot infection related to Charcot foot

is under treatment broad-spectrum antibiotics and he has been seen by the infectious

disease person. 







Job ID:  [id number]

## 2020-09-14 NOTE — SPC
Sonographic guided left upper extremity PICC placement



HISTORY: Osteomyelitis.



FINDINGS: After explaining the procedure and answering all questions, the left upper extremity was pr
epped and draped in usual sterile fashion.



Sterile technique, buffered local anesthesia, sonographic guidance, and a 22-gauge needle were used t
o carefully access the left cephalic vein.



Standard technique was used to place the tip of a 5 [place] single lumen PICC so that the tip lies at 
the level of the cavoatrial junction. Catheter was flushed and secured externally. Patient

tolerated the procedure well and was returned in unchanged condition.



Fluoroscopy time 0 seconds.



  



IMPRESSION :

Left upper extremity PICC is ready for use.



Reported By: JOEL Corrigan 

Electronically Signed:  9/14/2020 3:39 PM

## 2020-09-14 NOTE — PDOC.FM
- Subjective


Subjective: 


Mr. Perez is doing well this morning. He states the pain in his R hand is better

but not resolved, rating the pain at 5/10. He says the bandages on his foot have

been getting changed but no one has told him anything about how it's looking. He

is not complaining of pain there right now. He has continued having BMs. He den

ies subjective fever, chills, N/V, D. 








- Objective


Vital Signs & Weight: 


                             Vital Signs (12 hours)











  Temp Pulse Resp BP Pulse Ox


 


 09/14/20 04:33      93 L


 


 09/14/20 03:24  98.9 F  66  18  146/81 H  93 L


 


 09/13/20 20:40  98.3 F  66  16  159/91 H  92 L








                                     Weight











Admit Weight                   102.149 kg


 


Weight                         101.264 kg











                            Most Recent Monitor Data











Heart Rate from ECG            59


 


NIBP                           133/82


 


NIBP BP-Mean                   99


 


Respiration from ECG           25


 


SpO2                           93














I&O: 


                                        











 09/12/20 09/13/20 09/14/20





 06:59 06:59 06:59


 


Intake Total 540 1400 1350


 


Output Total 860 1050 1391


 


Balance -320 350 -41











Result Diagrams: 


                                 09/14/20 02:47





                                 09/14/20 02:47





Phys Exam





- Physical Examination


Constitutional: NAD (Sitting up comfortably using his phone)


Neck: supple, full ROM


Respiratory: no wheezing, no rales, clear to auscultation bilateral


Cardiovascular: RRR, no significant murmur (Telemetry has shown occasional A. 

flutter, PACs, and PVCs)


Musculoskeletal: pulses present, edema present (In L LE proximal to ankle to 

about lisa-tibia. Minimally pitting. Edema of R hand/wrist.)


Neurological: non-focal, moves all 4 limbs


Psychiatric: normal affect, A&O x 3


Skin: no rash


-: No erythema of R hand/wrist





Dx/Plan





- Plan


Plan: 


This is a 55M who presented to the ED with R arm pain and L sided CP





Afib with RVR


- Likely 2/2 to sepsis therefore not anticoagulating currently


- Received 2x 5mg IV push of dilt in ED followed by dilt gtt


   Initially had low BP response that improved after 2L NS


- Dilt gtt discontinued 9/9. Now PO.


   Admitting to IMCU.


   If BP continues to be a problem, will need to consider amio


   If hemodynamically unstable, consider defib


- Consulted cards (Dr. Saeed), appreciate recs


   Recommended continued PO Diltiazem


   Is considering long-term anticoagulation depending on if AFib resolves


   HR continues to be irregular. Feels comfortable sending him to floor on [facility] 

with outpt f/u


- ASA as anticoagulation therapy d/t CHADSVASC score of 1.


- Echo showed EF 50-55% with moderate tricuspid/mitral regurg and mildly dilated

LA


- 


- Plan for discharge today








Sepsis 


- Source: likely cellulitis vs. septic joint vs. gout


- SIRS criteria: tachycardic, tachypneic, WBC of 15.1


- Lactic acid: 2.8 > 1.4


- Vanc and Cefepime started 9/8


- ESR 68


- CRP37.5


- Uric acid nml


- BCx neg


- Foot xray not definitive. Showed severe soft tissue swelling of midfoot and 

neuropathic osteoarthropathy


- L foot MRI showed suspicion for periarticular abscess. Could not r/o osteo


- Consulted ortho. Dr. Ferrara recommended continuation of abx and I&D of L 

foot on 9/10


   Found purulence in joint space, concerning for osteomyelitis. Packed wound


   Comfortable sending pt home with f/u in 10-14 days


- Will start Norco 10mg q6h with morphine 2mg q4h for break through pain


- Dr. Silva consulted, changed Cefepime to Meropenem


- Foot Cx (2) positive only for skin jose alfredo


- Will consult PT/OT/post-acute care/CM d/t to severity of Charcot foot. 


   No placement necessary per PT/OT


- Dr. Chapin comfortable with sending him out of IMCU. Started thiamine.


- CMP showed hyponatremia, hypocalcemia, and anemia today, see below


- Plan for discharge today








R arm pain


- Unclear etiology


- Burning, sharp, deep and superficial pain worst at wrist but extending to tips

of fingers and proximal to elbow


- No erythema/edema/deformity


- R wrist xray normal


- Ortho consulted, appreciate recs


- Consider MRI


- Dr. Saeed recommended R UE U/S to r/o venous causes despite what would be 

an atypical presentation for it


   U/S unremarkable


- Pain and function improving. Continue to monitor


- Given Colchicine over the weekend which has helped. Suspect possible atypical 

gout presentation. Continue colchicine.








Hyperglycemia


- Glucose of 242 on arrival


- Denies history of DM


- A1C 5.2


- Monitor BGs.








Alcohol Use Disorder


- Reports drinking scotch daily


- Denies any history of withdrawal signs/symptoms


- ASE protocol in place


- No withdrawal sxs








Hyponatremia


- Na 125 on 9/11, down from 133 yesterday


- Unsure if related to LR in addition to good PO diet


- IVF discontinued


- Will order noon BMP and daily BMPs to monitor


- Resolved on f/u CMPs. Suspect sample drawn proximal to line








Hypocalcemia


- Ca 6.0 on CMP. 


- Corrected Ca 7.5, borderline normal


- D/c IVF and monitor.


- Resolved on f/u CMPs. Suspect sample drawn proximal to line








Anemia


- Hb 10.6 today, down from 15


- Suspect this is d/t dilution 2/2 IVF


- Fluids discontinued


- Will continue to monitor


- Resolved on f/u CMPs. Suspect sample drawn proximal to line








PCP: [facility]


PPx: Lovenox


Diet: NPO


Code: Full 





Dispo: will admit to IMCU; LOS <24hrs

## 2020-09-15 LAB
ALBUMIN SERPL BCG-MCNC: 2.9 G/DL (ref 3.5–5)
ALP SERPL-CCNC: 98 U/L (ref 40–110)
ALT SERPL W P-5'-P-CCNC: 30 U/L (ref 8–55)
ANION GAP SERPL CALC-SCNC: 11 MMOL/L (ref 10–20)
AST SERPL-CCNC: 25 U/L (ref 5–34)
BILIRUB SERPL-MCNC: 0.6 MG/DL (ref 0.2–1.2)
BUN SERPL-MCNC: 12 MG/DL (ref 8.4–25.7)
CALCIUM SERPL-MCNC: 8.7 MG/DL (ref 7.8–10.44)
CHLORIDE SERPL-SCNC: 99 MMOL/L (ref 98–107)
CO2 SERPL-SCNC: 29 MMOL/L (ref 22–29)
CREAT CL PREDICTED SERPL C-G-VRATE: 173 ML/MIN (ref 70–130)
GLOBULIN SER CALC-MCNC: 3.7 G/DL (ref 2.4–3.5)
GLUCOSE SERPL-MCNC: 98 MG/DL (ref 70–105)
HGB BLD-MCNC: 13.3 G/DL (ref 14–18)
MCH RBC QN AUTO: 34.4 PG (ref 27–31)
MCV RBC AUTO: 102 FL (ref 78–98)
MDIFF COMPLETE?: YES
PLATELET # BLD AUTO: 400 THOU/UL (ref 130–400)
POTASSIUM SERPL-SCNC: 4.1 MMOL/L (ref 3.5–5.1)
RBC # BLD AUTO: 3.87 MILL/UL (ref 4.7–6.1)
SODIUM SERPL-SCNC: 135 MMOL/L (ref 136–145)
WBC # BLD AUTO: 11.7 THOU/UL (ref 4.8–10.8)

## 2020-09-15 RX ADMIN — Medication SCH TAB: at 09:23

## 2020-09-15 RX ADMIN — HYDROCODONE BITARTRATE AND ACETAMINOPHEN SCH TAB: 10; 325 TABLET ORAL at 18:00

## 2020-09-15 RX ADMIN — MEROPENEM AND SODIUM CHLORIDE SCH MLS: 1 INJECTION, SOLUTION INTRAVENOUS at 21:56

## 2020-09-15 RX ADMIN — VANCOMYCIN HYDROCHLORIDE SCH MLS: 1 INJECTION, POWDER, LYOPHILIZED, FOR SOLUTION INTRAVENOUS at 21:45

## 2020-09-15 RX ADMIN — HYDROCODONE BITARTRATE AND ACETAMINOPHEN SCH TAB: 10; 325 TABLET ORAL at 00:14

## 2020-09-15 RX ADMIN — MEROPENEM AND SODIUM CHLORIDE SCH MLS: 1 INJECTION, SOLUTION INTRAVENOUS at 09:24

## 2020-09-15 RX ADMIN — VANCOMYCIN HYDROCHLORIDE SCH MLS: 1 INJECTION, POWDER, LYOPHILIZED, FOR SOLUTION INTRAVENOUS at 04:30

## 2020-09-15 RX ADMIN — MEROPENEM AND SODIUM CHLORIDE SCH: 1 INJECTION, SOLUTION INTRAVENOUS at 21:41

## 2020-09-15 RX ADMIN — HYDROCODONE BITARTRATE AND ACETAMINOPHEN SCH TAB: 10; 325 TABLET ORAL at 11:37

## 2020-09-15 RX ADMIN — HYDROCODONE BITARTRATE AND ACETAMINOPHEN SCH TAB: 10; 325 TABLET ORAL at 06:18

## 2020-09-15 RX ADMIN — MEROPENEM AND SODIUM CHLORIDE SCH MLS: 1 INJECTION, SOLUTION INTRAVENOUS at 02:34

## 2020-09-15 RX ADMIN — VANCOMYCIN HYDROCHLORIDE SCH MLS: 1 INJECTION, POWDER, LYOPHILIZED, FOR SOLUTION INTRAVENOUS at 11:40

## 2020-09-15 NOTE — PDOC.FM
- Subjective


Subjective: 


Renny is doing well this morning but continues to complain of R arm pain such 

that he can hardly use it. He says the pain is half what it used to be but is 

still significant enough to limit his functionality. There is still no 

edema/erythema/deformity. He is not complaining of L foot pain. He denies 

dyspnea/CP/V/D. His last BM was yesterday and was normal. 








- Objective


Vital Signs & Weight: 


                             Vital Signs (12 hours)











  Temp Pulse Resp BP Pulse Ox


 


 09/15/20 04:33  98.0 F  59 L  18  158/84 H  93 L


 


 09/14/20 20:58  98.9 F  67  20  174/87 H  91 L








                                     Weight











Admit Weight                   102.149 kg


 


Weight                         101.264 kg











                            Most Recent Monitor Data











Heart Rate from ECG            59


 


NIBP                           133/82


 


NIBP BP-Mean                   99


 


Respiration from ECG           25


 


SpO2                           93














I&O: 


                                        











 09/13/20 09/14/20 09/15/20





 06:59 06:59 06:59


 


Intake Total 1400 2430 480


 


Output Total 1050 2851 


 


Balance 350 -421 480











Result Diagrams: 


                                 09/15/20 04:28





                                 09/15/20 04:28





Phys Exam





- Physical Examination


Constitutional: NAD (Sitting up comfortably, watching tv with his daughter in 

the room)


HEENT: moist MMs


Neck: supple


Respiratory: no wheezing, no rales, clear to auscultation bilateral


Cardiovascular: RRR, no significant murmur


Musculoskeletal: no edema, pulses present


No erythema/edema/deformity of R hand


Neurological: non-focal, moves all 4 limbs


Psychiatric: normal affect, A&O x 3


Skin: no rash


-: L foot wrapped





Dx/Plan


(1) Afib


Code(s): I48.91 - UNSPECIFIED ATRIAL FIBRILLATION   Status: Acute   





(2) Sepsis


Code(s): A41.9 - SEPSIS, UNSPECIFIED ORGANISM   Status: Acute   





(3) Osteomyelitis


Code(s): M86.9 - OSTEOMYELITIS, UNSPECIFIED   Status: Acute   





(4) Hyperglycemia


Code(s): R73.9 - HYPERGLYCEMIA, UNSPECIFIED   Status: Acute   





(5) Right arm pain


Code(s): M79.601 - PAIN IN RIGHT ARM   Status: Acute   





(6) Alcohol abuse, daily use


Code(s): F10.10 - ALCOHOL ABUSE, UNCOMPLICATED   Status: Chronic   





(7) Charcot's joint of foot, non-diabetic


Code(s): M14.679 - CHARCOT'S JOINT, UNSPECIFIED ANKLE AND FOOT   Status: Chronic

  





- Plan


Plan: 


This is a 55M who presented to the ED with R arm pain and L sided CP





Afib with RVR


- Likely 2/2 to sepsis therefore not anticoagulating currently


- Received 2x 5mg IV push of dilt in ED followed by dilt gtt


   Initially had low BP response that improved after 2L NS


- Dilt gtt discontinued 9/9. Now PO.


   Admitting to IMCU.


   If BP continues to be a problem, will need to consider amio


   If hemodynamically unstable, consider defib


- Consulted cards (Dr. Saeed), appreciate recs


   Recommended continued PO Diltiazem


   Is considering long-term anticoagulation depending on if AFib resolves


   HR continues to be irregular. Feels comfortable sending him to floor on [facility] 

with outpt f/u


   9/14 started Lovenox d/t continued arrhythmias. Recommends sending home on 

Eliquis 5mg BID. 


   Recommends OP EP consult for possible ablation


- ASA as anticoagulation therapy d/t CHADSVASC score of 1.


- Echo showed EF 50-55% with moderate tricuspid/mitral regurg and mildly dilated

LA


- 


- Plan for discharge today








Sepsis 


- Source: likely cellulitis vs. septic joint vs. gout


- SIRS criteria: tachycardic, tachypneic, WBC of 15.1


- Lactic acid: 2.8 > 1.4


- Vanc and Cefepime started 9/8


- ESR 68


- CRP37.5


- Uric acid nml


- BCx neg


- Foot xray not definitive. Showed severe soft tissue swelling of midfoot and 

neuropathic osteoarthropathy


- L foot MRI showed suspicion for periarticular abscess. Could not r/o osteo


- Consulted ortho. Dr. Ferrara recommended continuation of abx and I&D of L 

foot on 9/10


   Found purulence in joint space, concerning for osteomyelitis. Packed wound


   Comfortable sending pt home with f/u in 10-14 days


- Will start Norco 10mg q6h with morphine 2mg q4h for break through pain


- Dr. Silva consulted, changed Cefepime to Meropenem


   PICC line placed 9/14


   CM in the process of setting up OP IV abx tx with Dr. Silva' office


- Foot Cx (2) positive only for skin jose alfredo


- Will consult PT/OT/post-acute care/CM d/t to severity of Charcot foot. 


   No placement necessary per PT/OT


- Dr. Chapin comfortable with sending him out of IMCU. Started thiamine.


- Plan for discharge today








R arm pain


- Unclear etiology


- Burning, sharp, deep and superficial pain worst at wrist but extending to tips

of fingers and proximal to elbow


- No erythema/edema/deformity


- R wrist xray normal


- Ortho consulted, appreciate recs


- Consider MRI


- Dr. Saeed recommended R UE U/S to r/o venous causes despite what would be 

an atypical presentation for it


   U/S unremarkable


- Pain and function improving. Continue to monitor


- Given Colchicine over the weekend which has helped. Suspect possible atypical 

gout presentation. Continue colchicine.


   Continues to complain of significant R arm pain, now just distal to shoulder








Charcot foot


- b/l 2/2 neuropathy s/p back surgery. 


- Has seen a podiatrist outpt for management








Hyperglycemia


- Glucose of 242 on arrival


- Denies history of DM


- A1C 5.2


- Monitor BGs.








Alcohol Use Disorder


- Reports drinking scotch daily


- Denies any history of withdrawal signs/symptoms


- ASE protocol in place


- No withdrawal sxs











PCP: [facility]


PPx: Lovenox


Diet: NPO


Code: Full 





Dispo: will admit to tele; LOS <24hrs

## 2020-09-15 NOTE — PRG
DATE OF SERVICE:  09/15/2020



SUBJECTIVE:  Mr. Perez is doing well.  No current complaints except for arm pain.

His heart rate has been stable. 



OBJECTIVE:  VITAL SIGNS:  Heart rate 69, blood pressure 158/84, respirations 18. 

LUNGS:  Clear to auscultation. 

HEART:  Regular rate and rhythm. 

ABDOMEN:  Soft, nontender, nondistended. 

EXTREMITIES:  No edema.



IMPRESSION:  

1. Paroxysmal atrial fibrillation.

2. Alcohol abuse.

3. Cellulitis.

4. Right arm pain.



RECOMMENDATIONS:  

1. Mr. Perez is currently doing well from a heart rate standpoint.

2. Plan is to transition from Lovenox to Eliquis once he is transitioning to home.

3. Switch Cardizem from 60 b.i.d. to 120 one p.o. q.a.m.

4. Continue lisinopril.

5. Antibiotic therapy per primary team.

6. Right arm pain evaluation per primary team.







Job ID:  [id number]

## 2020-09-16 VITALS — SYSTOLIC BLOOD PRESSURE: 183 MMHG | TEMPERATURE: 98 F | DIASTOLIC BLOOD PRESSURE: 105 MMHG

## 2020-09-16 LAB
ALBUMIN SERPL BCG-MCNC: 3.1 G/DL (ref 3.5–5)
ALP SERPL-CCNC: 105 U/L (ref 40–110)
ALT SERPL W P-5'-P-CCNC: 35 U/L (ref 8–55)
ANION GAP SERPL CALC-SCNC: 10 MMOL/L (ref 10–20)
AST SERPL-CCNC: 29 U/L (ref 5–34)
BILIRUB SERPL-MCNC: 0.5 MG/DL (ref 0.2–1.2)
BUN SERPL-MCNC: 13 MG/DL (ref 8.4–25.7)
CALCIUM SERPL-MCNC: 8.9 MG/DL (ref 7.8–10.44)
CHLORIDE SERPL-SCNC: 102 MMOL/L (ref 98–107)
CO2 SERPL-SCNC: 29 MMOL/L (ref 22–29)
CREAT CL PREDICTED SERPL C-G-VRATE: 170 ML/MIN (ref 70–130)
GLOBULIN SER CALC-MCNC: 3.9 G/DL (ref 2.4–3.5)
GLUCOSE SERPL-MCNC: 122 MG/DL (ref 70–105)
HGB BLD-MCNC: 13.8 G/DL (ref 14–18)
MCH RBC QN AUTO: 33.3 PG (ref 27–31)
MCV RBC AUTO: 102 FL (ref 78–98)
MDIFF COMPLETE?: YES
PLATELET # BLD AUTO: 413 THOU/UL (ref 130–400)
POTASSIUM SERPL-SCNC: 4.3 MMOL/L (ref 3.5–5.1)
RBC # BLD AUTO: 4.13 MILL/UL (ref 4.7–6.1)
SODIUM SERPL-SCNC: 137 MMOL/L (ref 136–145)
VANCOMYCIN TROUGH SERPL-MCNC: 8.5 UG/ML
WBC # BLD AUTO: 9.7 THOU/UL (ref 4.8–10.8)

## 2020-09-16 RX ADMIN — HYDROCODONE BITARTRATE AND ACETAMINOPHEN SCH: 10; 325 TABLET ORAL at 09:49

## 2020-09-16 RX ADMIN — HYDROCODONE BITARTRATE AND ACETAMINOPHEN SCH TAB: 10; 325 TABLET ORAL at 12:55

## 2020-09-16 RX ADMIN — Medication SCH: at 09:50

## 2020-09-16 RX ADMIN — MEROPENEM AND SODIUM CHLORIDE SCH: 1 INJECTION, SOLUTION INTRAVENOUS at 09:49

## 2020-09-16 RX ADMIN — MEROPENEM AND SODIUM CHLORIDE SCH: 1 INJECTION, SOLUTION INTRAVENOUS at 00:56

## 2020-09-16 RX ADMIN — HYDROCODONE BITARTRATE AND ACETAMINOPHEN SCH: 10; 325 TABLET ORAL at 10:32

## 2020-09-16 RX ADMIN — Medication SCH TAB: at 08:35

## 2020-09-16 RX ADMIN — HYDROCODONE BITARTRATE AND ACETAMINOPHEN SCH TAB: 10; 325 TABLET ORAL at 00:13

## 2020-09-16 RX ADMIN — MEROPENEM AND SODIUM CHLORIDE SCH MLS: 1 INJECTION, SOLUTION INTRAVENOUS at 08:36

## 2020-09-16 NOTE — PRG
DATE OF SERVICE:  09/16/2020



Mr. Perez says this morning his right arm feels back to near normal.  He has much

less pain.  The swelling has greatly diminished.  He will receive instruction on IV

antibiotics at noon and thereafter will be discharged.  I recommend keeping him on

colchicine and indomethacin for the next 7 to 10 days. 







Job ID:  [id number]

## 2020-09-16 NOTE — PDOC.FM
- Subjective


Subjective: 


Mr. Perez is doing well this morning and states his R wrist pain has 

significantly improved, rating it at 3/10. He continues to deny much L foot 

pain. He feels he is ready to go home. [facility] is going to be stopping by ar

ound noon to show family how to administer IV abx appropriately. Mr. Perez 

denies V/D/dyspnea/SOB/edema.








- Objective


Vital Signs & Weight: 


                                     Weight











Admit Weight                   102.149 kg


 


Weight                         101.264 kg











                            Most Recent Monitor Data











Heart Rate from ECG            59


 


NIBP                           133/82


 


NIBP BP-Mean                   99


 


Respiration from ECG           25


 


SpO2                           93














I&O: 


                                        











 09/15/20 09/16/20 09/17/20





 06:59 06:59 06:59


 


Intake Total 1530 950 


 


Output Total 1520 850 


 


Balance 10 100 











Result Diagrams: 


                                 09/16/20 04:45





                                 09/16/20 03:30





Phys Exam





- Physical Examination


Constitutional: NAD


Neck: supple, full ROM


Respiratory: no wheezing, no rales, clear to auscultation bilateral


Cardiovascular: RRR, no significant murmur


Telemetry shows continued BBB and occasional VTach


Musculoskeletal: no edema (In hands/ feet b/l), pulses present (Radial pulses 

b/l. dp pulses not checked this time)


Neurological: non-focal, moves all 4 limbs


Psychiatric: normal affect, A&O x 3


Skin: no rash





Dx/Plan


(1) Afib


Code(s): I48.91 - UNSPECIFIED ATRIAL FIBRILLATION   Status: Acute   





(2) Sepsis


Code(s): A41.9 - SEPSIS, UNSPECIFIED ORGANISM   Status: Acute   





(3) Osteomyelitis


Code(s): M86.9 - OSTEOMYELITIS, UNSPECIFIED   Status: Acute   





(4) Hyperglycemia


Code(s): R73.9 - HYPERGLYCEMIA, UNSPECIFIED   Status: Acute   





(5) Right arm pain


Code(s): M79.601 - PAIN IN RIGHT ARM   Status: Acute   





(6) Alcohol abuse, daily use


Code(s): F10.10 - ALCOHOL ABUSE, UNCOMPLICATED   Status: Chronic   





(7) Charcot's joint of foot, non-diabetic


Code(s): M14.679 - CHARCOT'S JOINT, UNSPECIFIED ANKLE AND FOOT   Status: Chronic

  





- Plan


Plan: 


This is a 55M who presented to the ED with R arm pain and L sided CP





Afib with RVR


- Likely 2/2 to sepsis therefore not anticoagulating currently


- Received 2x 5mg IV push of dilt in ED followed by dilt gtt


   Initially had low BP response that improved after 2L NS


- Dilt gtt discontinued 9/9. Now PO.


   Admitting to IMCU.


   If BP continues to be a problem, will need to consider amio


   If hemodynamically unstable, consider defib


- Consulted cards (Dr. Saeed), appreciate recs


   Recommended continued PO Diltiazem


   Is considering long-term anticoagulation depending on if AFib resolves


   HR continues to be irregular. Feels comfortable sending him to floor on [facility] 

with outpt f/u


   9/14 started Lovenox d/t continued arrhythmias. Recommends sending home on E

liquis 5mg BID. 


   Recommends OP EP consult for possible ablation


   Plan to send home on Eliquis, change Cardizem from 60mg BID to 120mg qd, 

continue Lisinopril


- ASA as anticoagulation therapy d/t CHADSVASC score of 1.


- Echo showed EF 50-55% with moderate tricuspid/mitral regurg and mildly dilated

LA


- 


- Plan for discharge today








Sepsis 


- Source: likely cellulitis vs. septic joint vs. gout


- SIRS criteria: tachycardic, tachypneic, WBC of 15.1


- Lactic acid: 2.8 > 1.4


- Vanc and Cefepime started 9/8


- ESR 68


- CRP37.5


- Uric acid nml


- BCx neg


- Foot xray not definitive. Showed severe soft tissue swelling of midfoot and 

neuropathic osteoarthropathy


- L foot MRI showed suspicion for periarticular abscess. Could not r/o osteo


- Consulted ortho. Dr. Ferrara recommended continuation of abx and I&D of L 

foot on 9/10


   Found purulence in joint space, concerning for osteomyelitis. Packed wound


   Comfortable sending pt home with f/u in 10-14 days


- Will start Norco 10mg q6h with morphine 2mg q4h for break through pain


- Dr. Silva consulted, changed Cefepime to Meropenem


   PICC line placed 9/14


   OP IV abx set up with [facility].


   Dapto 600mg qd + Invanz 1g qd


- [facility] will show family how to administer IV abx today at noon


- Foot Cx (2) positive only for skin jose alfredo


- Will consult PT/OT/post-acute care/CM d/t to severity of Charcot foot. 


   No placement necessary per PT/OT


- Dr. Chapin comfortable with sending him out of IMCU. Started thiamine.


- Family will be trained in wound care, as well


- Plan for discharge today








R arm pain


- Unclear etiology


- Burning, sharp, deep and superficial pain worst at wrist but extending to tips

of fingers and proximal to elbow


- No erythema/edema/deformity


- R wrist xray normal


- Ortho consulted, appreciate recs


- Consider MRI


- Dr. Saeed recommended R UE U/S to r/o venous causes despite what would be 

an atypical presentation for it


   U/S unremarkable


- Pain and function improving. Continue to monitor


- Given Colchicine over the weekend which has helped. Suspect possible atypical 

gout presentation. Continue colchicine.


   Continues to complain of significant R arm pain, now just distal to shoulder


- Started Indomethacin on 9/15. Pain has decreased to 3/10. 


- Send home on Colchicine qd and Indomethacin TID for another 10 days








Charcot foot


- b/l 2/2 neuropathy s/p back surgery. 


- Has seen a podiatrist outpt for management








Hyperglycemia


- Glucose of 242 on arrival


- Denies history of DM


- A1C 5.2


- Monitor BGs.








Alcohol Use Disorder


- Reports drinking scotch daily


- Denies any history of withdrawal signs/symptoms


- ASE protocol in place


- No withdrawal sxs











PCP: [facility]


PPx: Lovenox


Diet: NPO


Code: Full 





Dispo: will admit to tele; LOS <24hrs

## 2020-09-17 NOTE — PQF
CLINICAL DOCUMENTATION CLARIFICATION FORM:



Dear :  Josias MENARD MD          Date / Time: 09/17/2020

Please exercise your independent, professional judgment in responding to the 
clarification form. 

Clinical indicators are provided on the bottom of this form for your review





Please check appropriate box(es):

[x  ] Excisional Debridement: 

   [ x ] Excised    [  ] Cut away     [  ] Other: _________________

   Depth / layer: (deepest layer of debridement): 

   [  ] Skin   [  ] Subcutaneous     [  ] Fascia      [  ] Muscle     [  ] 
Tendon     [  x] Bone

             Appearance of wound: (e.g., down to fresh bleeding tissue, 
etc.)______________________________________

   Margins: (please specify): _________ / _____ x _____ x _____

   Instruments used:  [  ] Scissors    [  ] Scalpel      [  ] Other: 
_______________                                                                 
                                                                                
                                                                                
                                                       

[  ] Other procedure diagnosis ___________ (Please specify if any)

[  ] Unable to determine



Physician Signature:                         Date/Time:



For continuity of documentation, please document condition throughout progress 
notes and discharge summary.  Thank You.



To be completed by CDI/Coding staff for physician review: 







 Present Clinical Indicators - Signs / Symptoms / Labs Results and Location in 

Medical Record

 

[  x] Irrigation and debridement of left foot abscess Op note on 09/10

 

[ x ] We performed an excisional debridement with a knife as well as a curette 
Op note on 09/10

 

[  x] We performed an excisional debridement. We worked down to the bony level 
Op note on 09/10

 

[x  ] We opened the midfoot joints and irrigated into the joints themselves as 
well Op note on 09/10

 

[x  ] We dissected down through the subcutaneous tissue to the fascia, which was
opened. We encountered a large purulent abscess. Op note on 09/10

 

Present Risk Factors                                                            
             Results and Location in Medical Record

 

[  ] Bedridden/Immobile patient 

 

[ x ] Left foot infection with abscess over the dorsal foot Op note on 09/10

 

[  ]  

 

[  ]  

 

Present Treatments                                                              
               Results and Location in Medical Record

 

[x  ] Surgical intervention Irrigation and debridement of left foot abscess - Op
note on 09/10

 

[  ] Dressing changes/MIST therapy/Hyperbaric therapy 

 

[ x ] Packed wound comfortable sending pt home with f/u in 10-14 days Family 
medicine progress notes on 09/16

 

[ x ] Vancomycin 1.5 gm IV Medication on 09/09





CDS/ Signature: JACINTA                         Phone #: _____________        
Date/Time: 09/17/2020



                 This is a permanent part of the Medical Record

MTDD

## 2020-09-17 NOTE — PRG
DATE OF SERVICE:  09/16/2020



SUBJECTIVE:  Mr. Perez is doing well.  No current complaints.  He is feeling overall

much better.  He did have four beats of nonsustained VT on the telemetry monitor. 



OBJECTIVE:  VITAL SIGNS:  Blood pressure 120/70, pulse 80, respirations 20. 

LUNGS:  Clear to auscultation. 

HEART:  Regular rate and rhythm. 

ABDOMEN:  Soft, nontender, nondistended. 

EXTREMITIES:  No edema.



IMPRESSION:  

1. Atrial flutter.

2. Cellulitis.

3. Short run of nonsustained VT. 

Mr. Perez' LVEF estimated at 50% to 55%.  He does have moderate mitral regurgitation

present.  We will change his calcium channel blocker to a beta blocker. 



Recommend a novel oral anticoagulation therapy as an outpatient.  Plan is to follow

up with Mr. Perez in 1 week.  Otherwise, I have no further recommendations. 







Job ID:  [id number]